# Patient Record
Sex: FEMALE | Race: WHITE | NOT HISPANIC OR LATINO | Employment: UNEMPLOYED | URBAN - NONMETROPOLITAN AREA
[De-identification: names, ages, dates, MRNs, and addresses within clinical notes are randomized per-mention and may not be internally consistent; named-entity substitution may affect disease eponyms.]

---

## 2017-10-27 ENCOUNTER — APPOINTMENT (OUTPATIENT)
Dept: GENERAL RADIOLOGY | Facility: HOSPITAL | Age: 72
End: 2017-10-27

## 2017-10-27 ENCOUNTER — HOSPITAL ENCOUNTER (OUTPATIENT)
Facility: HOSPITAL | Age: 72
Setting detail: OBSERVATION
Discharge: HOME | End: 2017-11-01
Attending: FAMILY MEDICINE | Admitting: INTERNAL MEDICINE

## 2017-10-27 DIAGNOSIS — R53.1 WEAKNESS: ICD-10-CM

## 2017-10-27 DIAGNOSIS — R06.09 DYSPNEA ON EXERTION: ICD-10-CM

## 2017-10-27 DIAGNOSIS — F03.91 DEMENTIA WITH BEHAVIORAL DISTURBANCE, UNSPECIFIED DEMENTIA TYPE: Primary | ICD-10-CM

## 2017-10-27 DIAGNOSIS — R06.02 SHORTNESS OF BREATH: ICD-10-CM

## 2017-10-27 LAB
ALBUMIN SERPL-MCNC: 3.7 G/DL (ref 3.4–4.8)
ALBUMIN/GLOB SERPL: 1.3 G/DL (ref 1.5–2.5)
ALP SERPL-CCNC: 109 U/L (ref 35–104)
ALT SERPL W P-5'-P-CCNC: 18 U/L (ref 10–36)
ANION GAP SERPL CALCULATED.3IONS-SCNC: 5.2 MMOL/L (ref 3.6–11.2)
AST SERPL-CCNC: 21 U/L (ref 10–30)
BASOPHILS # BLD AUTO: 0.05 10*3/MM3 (ref 0–0.3)
BASOPHILS NFR BLD AUTO: 0.5 % (ref 0–2)
BILIRUB SERPL-MCNC: 0.4 MG/DL (ref 0.2–1.8)
BNP SERPL-MCNC: 169 PG/ML (ref 0–100)
BUN BLD-MCNC: 11 MG/DL (ref 7–21)
BUN/CREAT SERPL: 9.6 (ref 7–25)
CALCIUM SPEC-SCNC: 9.1 MG/DL (ref 7.7–10)
CHLORIDE SERPL-SCNC: 105 MMOL/L (ref 99–112)
CO2 SERPL-SCNC: 32.8 MMOL/L (ref 24.3–31.9)
CREAT BLD-MCNC: 1.14 MG/DL (ref 0.43–1.29)
DEPRECATED RDW RBC AUTO: 42.9 FL (ref 37–54)
EOSINOPHIL # BLD AUTO: 0.3 10*3/MM3 (ref 0–0.7)
EOSINOPHIL NFR BLD AUTO: 3.1 % (ref 0–7)
ERYTHROCYTE [DISTWIDTH] IN BLOOD BY AUTOMATED COUNT: 13.9 % (ref 11.5–14.5)
GFR SERPL CREATININE-BSD FRML MDRD: 47 ML/MIN/1.73
GLOBULIN UR ELPH-MCNC: 2.9 GM/DL
GLUCOSE BLD-MCNC: 144 MG/DL (ref 70–110)
HCT VFR BLD AUTO: 34.4 % (ref 37–47)
HGB BLD-MCNC: 10.4 G/DL (ref 12–16)
IMM GRANULOCYTES # BLD: 0.03 10*3/MM3 (ref 0–0.03)
IMM GRANULOCYTES NFR BLD: 0.3 % (ref 0–0.5)
LIPASE SERPL-CCNC: 47 U/L (ref 13–60)
LYMPHOCYTES # BLD AUTO: 1.4 10*3/MM3 (ref 1–3)
LYMPHOCYTES NFR BLD AUTO: 14.6 % (ref 16–46)
MCH RBC QN AUTO: 26.2 PG (ref 27–33)
MCHC RBC AUTO-ENTMCNC: 30.2 G/DL (ref 33–37)
MCV RBC AUTO: 86.6 FL (ref 80–94)
MONOCYTES # BLD AUTO: 0.83 10*3/MM3 (ref 0.1–0.9)
MONOCYTES NFR BLD AUTO: 8.6 % (ref 0–12)
NEUTROPHILS # BLD AUTO: 7 10*3/MM3 (ref 1.4–6.5)
NEUTROPHILS NFR BLD AUTO: 72.9 % (ref 40–75)
OSMOLALITY SERPL CALC.SUM OF ELEC: 286.9 MOSM/KG (ref 273–305)
PLATELET # BLD AUTO: 375 10*3/MM3 (ref 130–400)
PMV BLD AUTO: 9.3 FL (ref 6–10)
POTASSIUM BLD-SCNC: 4 MMOL/L (ref 3.5–5.3)
PROT SERPL-MCNC: 6.6 G/DL (ref 6–8)
RBC # BLD AUTO: 3.97 10*6/MM3 (ref 4.2–5.4)
SODIUM BLD-SCNC: 143 MMOL/L (ref 135–153)
TROPONIN I SERPL-MCNC: <0.006 NG/ML
WBC NRBC COR # BLD: 9.61 10*3/MM3 (ref 4.5–12.5)

## 2017-10-27 PROCEDURE — 80053 COMPREHEN METABOLIC PANEL: CPT | Performed by: FAMILY MEDICINE

## 2017-10-27 PROCEDURE — 83880 ASSAY OF NATRIURETIC PEPTIDE: CPT | Performed by: FAMILY MEDICINE

## 2017-10-27 PROCEDURE — 84484 ASSAY OF TROPONIN QUANT: CPT | Performed by: FAMILY MEDICINE

## 2017-10-27 PROCEDURE — 93010 ELECTROCARDIOGRAM REPORT: CPT | Performed by: INTERNAL MEDICINE

## 2017-10-27 PROCEDURE — 71010 HC CHEST PA OR AP: CPT

## 2017-10-27 PROCEDURE — 85025 COMPLETE CBC W/AUTO DIFF WBC: CPT | Performed by: FAMILY MEDICINE

## 2017-10-27 PROCEDURE — 71010 XR CHEST 1 VW: CPT | Performed by: RADIOLOGY

## 2017-10-27 PROCEDURE — 83690 ASSAY OF LIPASE: CPT | Performed by: FAMILY MEDICINE

## 2017-10-27 PROCEDURE — 99285 EMERGENCY DEPT VISIT HI MDM: CPT

## 2017-10-27 PROCEDURE — 93005 ELECTROCARDIOGRAM TRACING: CPT | Performed by: FAMILY MEDICINE

## 2017-10-27 RX ORDER — BUMETANIDE 0.5 MG/1
2 TABLET ORAL ONCE
Status: COMPLETED | OUTPATIENT
Start: 2017-10-27 | End: 2017-10-27

## 2017-10-27 RX ADMIN — BUMETANIDE 2 MG: 0.5 TABLET ORAL at 21:21

## 2017-10-28 ENCOUNTER — APPOINTMENT (OUTPATIENT)
Dept: CT IMAGING | Facility: HOSPITAL | Age: 72
End: 2017-10-28

## 2017-10-28 ENCOUNTER — APPOINTMENT (OUTPATIENT)
Dept: CARDIOLOGY | Facility: HOSPITAL | Age: 72
End: 2017-10-28
Attending: INTERNAL MEDICINE

## 2017-10-28 PROBLEM — R06.00 DYSPNEA: Status: ACTIVE | Noted: 2017-10-28

## 2017-10-28 LAB
ALBUMIN SERPL-MCNC: 3.5 G/DL (ref 3.4–4.8)
ALBUMIN/GLOB SERPL: 1 G/DL (ref 1.5–2.5)
ALP SERPL-CCNC: 107 U/L (ref 35–104)
ALT SERPL W P-5'-P-CCNC: 13 U/L (ref 10–36)
ANION GAP SERPL CALCULATED.3IONS-SCNC: 6.2 MMOL/L (ref 3.6–11.2)
AST SERPL-CCNC: 17 U/L (ref 10–30)
BASOPHILS # BLD AUTO: 0.05 10*3/MM3 (ref 0–0.3)
BASOPHILS NFR BLD AUTO: 0.6 % (ref 0–2)
BILIRUB SERPL-MCNC: 0.5 MG/DL (ref 0.2–1.8)
BILIRUB UR QL STRIP: NEGATIVE
BUN BLD-MCNC: 10 MG/DL (ref 7–21)
BUN/CREAT SERPL: 7.8 (ref 7–25)
CALCIUM SPEC-SCNC: 9 MG/DL (ref 7.7–10)
CHLORIDE SERPL-SCNC: 104 MMOL/L (ref 99–112)
CLARITY UR: ABNORMAL
CO2 SERPL-SCNC: 34.8 MMOL/L (ref 24.3–31.9)
COLOR UR: YELLOW
CREAT BLD-MCNC: 1.28 MG/DL (ref 0.43–1.29)
DEPRECATED RDW RBC AUTO: 43 FL (ref 37–54)
EOSINOPHIL # BLD AUTO: 0.29 10*3/MM3 (ref 0–0.7)
EOSINOPHIL NFR BLD AUTO: 3.3 % (ref 0–7)
ERYTHROCYTE [DISTWIDTH] IN BLOOD BY AUTOMATED COUNT: 13.9 % (ref 11.5–14.5)
FOLATE SERPL-MCNC: 10.94 NG/ML (ref 5.4–20)
GFR SERPL CREATININE-BSD FRML MDRD: 41 ML/MIN/1.73
GLOBULIN UR ELPH-MCNC: 3.4 GM/DL
GLUCOSE BLD-MCNC: 185 MG/DL (ref 70–110)
GLUCOSE BLDC GLUCOMTR-MCNC: 154 MG/DL (ref 70–130)
GLUCOSE BLDC GLUCOMTR-MCNC: 182 MG/DL (ref 70–130)
GLUCOSE BLDC GLUCOMTR-MCNC: 212 MG/DL (ref 70–130)
GLUCOSE BLDC GLUCOMTR-MCNC: 235 MG/DL (ref 70–130)
GLUCOSE UR STRIP-MCNC: NEGATIVE MG/DL
HBA1C MFR BLD: 8.4 % (ref 4.5–5.7)
HCT VFR BLD AUTO: 34.5 % (ref 37–47)
HGB BLD-MCNC: 10.5 G/DL (ref 12–16)
HGB UR QL STRIP.AUTO: NEGATIVE
IMM GRANULOCYTES # BLD: 0.02 10*3/MM3 (ref 0–0.03)
IMM GRANULOCYTES NFR BLD: 0.2 % (ref 0–0.5)
KETONES UR QL STRIP: NEGATIVE
LEUKOCYTE ESTERASE UR QL STRIP.AUTO: NEGATIVE
LYMPHOCYTES # BLD AUTO: 1.23 10*3/MM3 (ref 1–3)
LYMPHOCYTES NFR BLD AUTO: 14 % (ref 16–46)
MAGNESIUM SERPL-MCNC: 1.7 MG/DL (ref 1.7–2.6)
MCH RBC QN AUTO: 26.4 PG (ref 27–33)
MCHC RBC AUTO-ENTMCNC: 30.4 G/DL (ref 33–37)
MCV RBC AUTO: 86.9 FL (ref 80–94)
MONOCYTES # BLD AUTO: 0.8 10*3/MM3 (ref 0.1–0.9)
MONOCYTES NFR BLD AUTO: 9.1 % (ref 0–12)
NEUTROPHILS # BLD AUTO: 6.39 10*3/MM3 (ref 1.4–6.5)
NEUTROPHILS NFR BLD AUTO: 72.8 % (ref 40–75)
NITRITE UR QL STRIP: NEGATIVE
OSMOLALITY SERPL CALC.SUM OF ELEC: 292.5 MOSM/KG (ref 273–305)
PH UR STRIP.AUTO: 8.5 [PH] (ref 5–8)
PLATELET # BLD AUTO: 345 10*3/MM3 (ref 130–400)
PMV BLD AUTO: 9.5 FL (ref 6–10)
POTASSIUM BLD-SCNC: 3.4 MMOL/L (ref 3.5–5.3)
PROT SERPL-MCNC: 6.9 G/DL (ref 6–8)
PROT UR QL STRIP: NEGATIVE
RBC # BLD AUTO: 3.97 10*6/MM3 (ref 4.2–5.4)
SODIUM BLD-SCNC: 145 MMOL/L (ref 135–153)
SP GR UR STRIP: 1.01 (ref 1–1.03)
TSH SERPL DL<=0.05 MIU/L-ACNC: 2.55 MIU/ML (ref 0.55–4.78)
UROBILINOGEN UR QL STRIP: ABNORMAL
VIT B12 BLD-MCNC: 219 PG/ML (ref 211–911)
WBC NRBC COR # BLD: 8.78 10*3/MM3 (ref 4.5–12.5)

## 2017-10-28 PROCEDURE — 81003 URINALYSIS AUTO W/O SCOPE: CPT | Performed by: FAMILY MEDICINE

## 2017-10-28 PROCEDURE — 25010000002 INFLUENZA VAC SPLIT QUAD 0.5 ML SUSPENSION PREFILLED SYRINGE: Performed by: INTERNAL MEDICINE

## 2017-10-28 PROCEDURE — 25010000002 HYDRALAZINE PER 20 MG: Performed by: INTERNAL MEDICINE

## 2017-10-28 PROCEDURE — 25010000002 FUROSEMIDE PER 20 MG: Performed by: INTERNAL MEDICINE

## 2017-10-28 PROCEDURE — G0008 ADMIN INFLUENZA VIRUS VAC: HCPCS | Performed by: INTERNAL MEDICINE

## 2017-10-28 PROCEDURE — 82962 GLUCOSE BLOOD TEST: CPT

## 2017-10-28 PROCEDURE — 82746 ASSAY OF FOLIC ACID SERUM: CPT | Performed by: INTERNAL MEDICINE

## 2017-10-28 PROCEDURE — 83735 ASSAY OF MAGNESIUM: CPT | Performed by: INTERNAL MEDICINE

## 2017-10-28 PROCEDURE — 83036 HEMOGLOBIN GLYCOSYLATED A1C: CPT | Performed by: INTERNAL MEDICINE

## 2017-10-28 PROCEDURE — 82607 VITAMIN B-12: CPT | Performed by: INTERNAL MEDICINE

## 2017-10-28 PROCEDURE — 84443 ASSAY THYROID STIM HORMONE: CPT | Performed by: INTERNAL MEDICINE

## 2017-10-28 PROCEDURE — 96372 THER/PROPH/DIAG INJ SC/IM: CPT

## 2017-10-28 PROCEDURE — 94799 UNLISTED PULMONARY SVC/PX: CPT

## 2017-10-28 PROCEDURE — 85025 COMPLETE CBC W/AUTO DIFF WBC: CPT | Performed by: INTERNAL MEDICINE

## 2017-10-28 PROCEDURE — 99220 PR INITIAL OBSERVATION CARE/DAY 70 MINUTES: CPT | Performed by: INTERNAL MEDICINE

## 2017-10-28 PROCEDURE — G0378 HOSPITAL OBSERVATION PER HR: HCPCS

## 2017-10-28 PROCEDURE — 90686 IIV4 VACC NO PRSV 0.5 ML IM: CPT | Performed by: INTERNAL MEDICINE

## 2017-10-28 PROCEDURE — 96374 THER/PROPH/DIAG INJ IV PUSH: CPT

## 2017-10-28 PROCEDURE — 63710000001 INSULIN ASPART PER 5 UNITS: Performed by: INTERNAL MEDICINE

## 2017-10-28 PROCEDURE — 96375 TX/PRO/DX INJ NEW DRUG ADDON: CPT

## 2017-10-28 PROCEDURE — 80053 COMPREHEN METABOLIC PANEL: CPT | Performed by: INTERNAL MEDICINE

## 2017-10-28 PROCEDURE — 25010000002 HEPARIN (PORCINE) PER 1000 UNITS: Performed by: INTERNAL MEDICINE

## 2017-10-28 RX ORDER — CARVEDILOL 6.25 MG/1
6.25 TABLET ORAL EVERY 12 HOURS SCHEDULED
Status: DISCONTINUED | OUTPATIENT
Start: 2017-10-28 | End: 2017-10-28

## 2017-10-28 RX ORDER — LEVOTHYROXINE SODIUM 0.03 MG/1
25 TABLET ORAL DAILY
Status: DISCONTINUED | OUTPATIENT
Start: 2017-10-28 | End: 2017-11-01 | Stop reason: HOSPADM

## 2017-10-28 RX ORDER — FUROSEMIDE 10 MG/ML
20 INJECTION INTRAMUSCULAR; INTRAVENOUS DAILY
Status: DISCONTINUED | OUTPATIENT
Start: 2017-10-28 | End: 2017-10-29

## 2017-10-28 RX ORDER — ATORVASTATIN CALCIUM 20 MG/1
20 TABLET, FILM COATED ORAL DAILY
COMMUNITY

## 2017-10-28 RX ORDER — CITALOPRAM 10 MG/1
10 TABLET ORAL DAILY
COMMUNITY

## 2017-10-28 RX ORDER — DONEPEZIL HYDROCHLORIDE 5 MG/1
10 TABLET, FILM COATED ORAL DAILY
Status: DISCONTINUED | OUTPATIENT
Start: 2017-10-28 | End: 2017-11-01 | Stop reason: HOSPADM

## 2017-10-28 RX ORDER — ATORVASTATIN CALCIUM 20 MG/1
20 TABLET, FILM COATED ORAL DAILY
Status: DISCONTINUED | OUTPATIENT
Start: 2017-10-28 | End: 2017-11-01 | Stop reason: HOSPADM

## 2017-10-28 RX ORDER — SODIUM CHLORIDE 0.9 % (FLUSH) 0.9 %
1-10 SYRINGE (ML) INJECTION AS NEEDED
Status: DISCONTINUED | OUTPATIENT
Start: 2017-10-28 | End: 2017-11-01 | Stop reason: HOSPADM

## 2017-10-28 RX ORDER — POTASSIUM CHLORIDE 20 MEQ/1
20 TABLET, EXTENDED RELEASE ORAL DAILY
Status: DISCONTINUED | OUTPATIENT
Start: 2017-10-28 | End: 2017-10-31

## 2017-10-28 RX ORDER — FUROSEMIDE 20 MG/1
20 TABLET ORAL DAILY
COMMUNITY
End: 2017-11-01 | Stop reason: HOSPADM

## 2017-10-28 RX ORDER — NICOTINE POLACRILEX 4 MG
15 LOZENGE BUCCAL
Status: DISCONTINUED | OUTPATIENT
Start: 2017-10-28 | End: 2017-11-01 | Stop reason: HOSPADM

## 2017-10-28 RX ORDER — IPRATROPIUM BROMIDE AND ALBUTEROL SULFATE 2.5; .5 MG/3ML; MG/3ML
3 SOLUTION RESPIRATORY (INHALATION) 3 TIMES DAILY PRN
COMMUNITY

## 2017-10-28 RX ORDER — HEPARIN SODIUM 5000 [USP'U]/ML
5000 INJECTION, SOLUTION INTRAVENOUS; SUBCUTANEOUS EVERY 12 HOURS SCHEDULED
Status: DISCONTINUED | OUTPATIENT
Start: 2017-10-28 | End: 2017-11-01 | Stop reason: HOSPADM

## 2017-10-28 RX ORDER — CARVEDILOL 6.25 MG/1
12.5 TABLET ORAL EVERY 12 HOURS SCHEDULED
Status: DISCONTINUED | OUTPATIENT
Start: 2017-10-29 | End: 2017-11-01 | Stop reason: HOSPADM

## 2017-10-28 RX ORDER — POTASSIUM CHLORIDE 750 MG/1
10 TABLET, FILM COATED, EXTENDED RELEASE ORAL DAILY
COMMUNITY
End: 2017-11-01 | Stop reason: HOSPADM

## 2017-10-28 RX ORDER — LOSARTAN POTASSIUM AND HYDROCHLOROTHIAZIDE 25; 100 MG/1; MG/1
1 TABLET ORAL DAILY
Status: ON HOLD | COMMUNITY
End: 2017-10-28

## 2017-10-28 RX ORDER — LEVOTHYROXINE SODIUM 0.03 MG/1
25 TABLET ORAL DAILY
COMMUNITY

## 2017-10-28 RX ORDER — METRONIDAZOLE 250 MG/1
250 TABLET ORAL 3 TIMES DAILY
Status: ON HOLD | COMMUNITY
End: 2017-10-28

## 2017-10-28 RX ORDER — POTASSIUM CHLORIDE 750 MG/1
10 TABLET, FILM COATED, EXTENDED RELEASE ORAL DAILY
Status: CANCELLED | OUTPATIENT
Start: 2017-10-28

## 2017-10-28 RX ORDER — LANOLIN ALCOHOL/MO/W.PET/CERES
1000 CREAM (GRAM) TOPICAL DAILY
Status: DISCONTINUED | OUTPATIENT
Start: 2017-10-29 | End: 2017-11-01 | Stop reason: HOSPADM

## 2017-10-28 RX ORDER — DONEPEZIL HYDROCHLORIDE 10 MG/1
10 TABLET, FILM COATED ORAL DAILY
COMMUNITY
End: 2017-11-01 | Stop reason: HOSPADM

## 2017-10-28 RX ORDER — FUROSEMIDE 20 MG/1
20 TABLET ORAL DAILY
Status: CANCELLED | OUTPATIENT
Start: 2017-10-28

## 2017-10-28 RX ORDER — LEVOFLOXACIN 250 MG/1
250 TABLET ORAL DAILY
Status: ON HOLD | COMMUNITY
End: 2017-10-28

## 2017-10-28 RX ORDER — METFORMIN HYDROCHLORIDE 500 MG/1
500 TABLET, EXTENDED RELEASE ORAL 2 TIMES DAILY
COMMUNITY
End: 2017-11-01 | Stop reason: HOSPADM

## 2017-10-28 RX ORDER — HYDRALAZINE HYDROCHLORIDE 20 MG/ML
20 INJECTION INTRAMUSCULAR; INTRAVENOUS EVERY 6 HOURS PRN
Status: DISCONTINUED | OUTPATIENT
Start: 2017-10-28 | End: 2017-11-01 | Stop reason: HOSPADM

## 2017-10-28 RX ORDER — DEXTROSE MONOHYDRATE 25 G/50ML
25 INJECTION, SOLUTION INTRAVENOUS
Status: DISCONTINUED | OUTPATIENT
Start: 2017-10-28 | End: 2017-11-01 | Stop reason: HOSPADM

## 2017-10-28 RX ORDER — CITALOPRAM 20 MG/1
10 TABLET ORAL DAILY
Status: DISCONTINUED | OUTPATIENT
Start: 2017-10-28 | End: 2017-11-01 | Stop reason: HOSPADM

## 2017-10-28 RX ORDER — IPRATROPIUM BROMIDE AND ALBUTEROL SULFATE 2.5; .5 MG/3ML; MG/3ML
3 SOLUTION RESPIRATORY (INHALATION) 3 TIMES DAILY PRN
Status: CANCELLED | OUTPATIENT
Start: 2017-10-28

## 2017-10-28 RX ORDER — IPRATROPIUM BROMIDE AND ALBUTEROL SULFATE 2.5; .5 MG/3ML; MG/3ML
3 SOLUTION RESPIRATORY (INHALATION) EVERY 6 HOURS PRN
Status: DISCONTINUED | OUTPATIENT
Start: 2017-10-28 | End: 2017-11-01 | Stop reason: HOSPADM

## 2017-10-28 RX ADMIN — INSULIN ASPART 2 UNITS: 100 INJECTION, SOLUTION INTRAVENOUS; SUBCUTANEOUS at 07:43

## 2017-10-28 RX ADMIN — INFLUENZA VIRUS VACCINE 0.5 ML: 15; 15; 15; 15 SUSPENSION INTRAMUSCULAR at 12:16

## 2017-10-28 RX ADMIN — CITALOPRAM HYDROBROMIDE 10 MG: 20 TABLET ORAL at 20:38

## 2017-10-28 RX ADMIN — HYDRALAZINE HYDROCHLORIDE 20 MG: 20 INJECTION INTRAMUSCULAR; INTRAVENOUS at 06:52

## 2017-10-28 RX ADMIN — HEPARIN SODIUM 5000 UNITS: 5000 INJECTION, SOLUTION INTRAVENOUS; SUBCUTANEOUS at 09:21

## 2017-10-28 RX ADMIN — INSULIN ASPART 2 UNITS: 100 INJECTION, SOLUTION INTRAVENOUS; SUBCUTANEOUS at 20:32

## 2017-10-28 RX ADMIN — CARVEDILOL 6.25 MG: 6.25 TABLET, FILM COATED ORAL at 06:51

## 2017-10-28 RX ADMIN — DONEPEZIL HYDROCHLORIDE 10 MG: 5 TABLET, FILM COATED ORAL at 20:37

## 2017-10-28 RX ADMIN — FUROSEMIDE 20 MG: 10 INJECTION, SOLUTION INTRAMUSCULAR; INTRAVENOUS at 09:21

## 2017-10-28 RX ADMIN — ATORVASTATIN CALCIUM 20 MG: 20 TABLET, FILM COATED ORAL at 20:37

## 2017-10-28 RX ADMIN — HEPARIN SODIUM 5000 UNITS: 5000 INJECTION, SOLUTION INTRAVENOUS; SUBCUTANEOUS at 20:32

## 2017-10-28 RX ADMIN — INSULIN ASPART 3 UNITS: 100 INJECTION, SOLUTION INTRAVENOUS; SUBCUTANEOUS at 12:03

## 2017-10-28 RX ADMIN — INSULIN ASPART 3 UNITS: 100 INJECTION, SOLUTION INTRAVENOUS; SUBCUTANEOUS at 17:39

## 2017-10-28 RX ADMIN — LEVOTHYROXINE SODIUM 25 MCG: 25 TABLET ORAL at 20:37

## 2017-10-28 RX ADMIN — POTASSIUM CHLORIDE 20 MEQ: 1500 TABLET, EXTENDED RELEASE ORAL at 09:21

## 2017-10-28 RX ADMIN — CARVEDILOL 6.25 MG: 6.25 TABLET, FILM COATED ORAL at 20:32

## 2017-10-29 ENCOUNTER — APPOINTMENT (OUTPATIENT)
Dept: CARDIOLOGY | Facility: HOSPITAL | Age: 72
End: 2017-10-29
Attending: INTERNAL MEDICINE

## 2017-10-29 LAB
ANION GAP SERPL CALCULATED.3IONS-SCNC: 9.4 MMOL/L (ref 3.6–11.2)
BASOPHILS # BLD AUTO: 0.05 10*3/MM3 (ref 0–0.3)
BASOPHILS NFR BLD AUTO: 0.6 % (ref 0–2)
BH CV ECHO MEAS - % IVS THICK: -4.3 %
BH CV ECHO MEAS - % LVPW THICK: 7.7 %
BH CV ECHO MEAS - ACS: 1.6 CM
BH CV ECHO MEAS - AO ROOT AREA (BSA CORRECTED): 1.5
BH CV ECHO MEAS - AO ROOT AREA: 6.9 CM^2
BH CV ECHO MEAS - AO ROOT DIAM: 3 CM
BH CV ECHO MEAS - BSA(HAYCOCK): 2.1 M^2
BH CV ECHO MEAS - BSA: 2 M^2
BH CV ECHO MEAS - BZI_BMI: 37.7 KILOGRAMS/M^2
BH CV ECHO MEAS - BZI_METRIC_HEIGHT: 160 CM
BH CV ECHO MEAS - BZI_METRIC_WEIGHT: 96.6 KG
BH CV ECHO MEAS - CONTRAST EF 4CH: 78.2 ML/M^2
BH CV ECHO MEAS - EDV(CUBED): 120.6 ML
BH CV ECHO MEAS - EDV(MOD-SP4): 55 ML
BH CV ECHO MEAS - EDV(TEICH): 115 ML
BH CV ECHO MEAS - EF(CUBED): 77.1 %
BH CV ECHO MEAS - EF(MOD-SP4): 78.2 %
BH CV ECHO MEAS - EF(TEICH): 68.9 %
BH CV ECHO MEAS - ESV(CUBED): 27.7 ML
BH CV ECHO MEAS - ESV(MOD-SP4): 12 ML
BH CV ECHO MEAS - ESV(TEICH): 35.7 ML
BH CV ECHO MEAS - FS: 38.8 %
BH CV ECHO MEAS - IVS/LVPW: 0.88
BH CV ECHO MEAS - IVSD: 1.2 CM
BH CV ECHO MEAS - IVSS: 1.1 CM
BH CV ECHO MEAS - LV DIASTOLIC VOL/BSA (35-75): 27.7 ML/M^2
BH CV ECHO MEAS - LV MASS(C)D: 238.9 GRAMS
BH CV ECHO MEAS - LV MASS(C)DI: 120.3 GRAMS/M^2
BH CV ECHO MEAS - LV MASS(C)S: 119.5 GRAMS
BH CV ECHO MEAS - LV MASS(C)SI: 60.1 GRAMS/M^2
BH CV ECHO MEAS - LV SYSTOLIC VOL/BSA (12-30): 6 ML/M^2
BH CV ECHO MEAS - LVIDD: 4.9 CM
BH CV ECHO MEAS - LVIDS: 3 CM
BH CV ECHO MEAS - LVLD AP4: 6.6 CM
BH CV ECHO MEAS - LVLS AP4: 6.1 CM
BH CV ECHO MEAS - LVOT AREA (M): 2 CM^2
BH CV ECHO MEAS - LVOT AREA: 2 CM^2
BH CV ECHO MEAS - LVOT DIAM: 1.6 CM
BH CV ECHO MEAS - LVPWD: 1.3 CM
BH CV ECHO MEAS - LVPWS: 1.4 CM
BH CV ECHO MEAS - MV A MAX VEL: 136.6 CM/SEC
BH CV ECHO MEAS - MV DEC SLOPE: 481.1 CM/SEC^2
BH CV ECHO MEAS - MV E MAX VEL: 115.7 CM/SEC
BH CV ECHO MEAS - MV E/A: 0.85
BH CV ECHO MEAS - MV P1/2T MAX VEL: 116.8 CM/SEC
BH CV ECHO MEAS - MV P1/2T: 71.1 MSEC
BH CV ECHO MEAS - MVA P1/2T LCG: 1.9 CM^2
BH CV ECHO MEAS - MVA(P1/2T): 3.1 CM^2
BH CV ECHO MEAS - RAP SYSTOLE: 10 MMHG
BH CV ECHO MEAS - RVDD: 2 CM
BH CV ECHO MEAS - RVSP: 38.3 MMHG
BH CV ECHO MEAS - SI(CUBED): 46.8 ML/M^2
BH CV ECHO MEAS - SI(MOD-SP4): 21.6 ML/M^2
BH CV ECHO MEAS - SI(TEICH): 39.9 ML/M^2
BH CV ECHO MEAS - SV(CUBED): 92.9 ML
BH CV ECHO MEAS - SV(MOD-SP4): 43 ML
BH CV ECHO MEAS - SV(TEICH): 79.3 ML
BH CV ECHO MEAS - TR MAX VEL: 266 CM/SEC
BUN BLD-MCNC: 11 MG/DL (ref 7–21)
BUN/CREAT SERPL: 9.2 (ref 7–25)
CALCIUM SPEC-SCNC: 8.9 MG/DL (ref 7.7–10)
CHLORIDE SERPL-SCNC: 102 MMOL/L (ref 99–112)
CO2 SERPL-SCNC: 28.6 MMOL/L (ref 24.3–31.9)
CREAT BLD-MCNC: 1.2 MG/DL (ref 0.43–1.29)
DEPRECATED RDW RBC AUTO: 43.6 FL (ref 37–54)
EOSINOPHIL # BLD AUTO: 0.28 10*3/MM3 (ref 0–0.7)
EOSINOPHIL NFR BLD AUTO: 3.3 % (ref 0–7)
ERYTHROCYTE [DISTWIDTH] IN BLOOD BY AUTOMATED COUNT: 13.9 % (ref 11.5–14.5)
GFR SERPL CREATININE-BSD FRML MDRD: 44 ML/MIN/1.73
GLUCOSE BLD-MCNC: 290 MG/DL (ref 70–110)
GLUCOSE BLDC GLUCOMTR-MCNC: 157 MG/DL (ref 70–130)
GLUCOSE BLDC GLUCOMTR-MCNC: 243 MG/DL (ref 70–130)
GLUCOSE BLDC GLUCOMTR-MCNC: 248 MG/DL (ref 70–130)
GLUCOSE BLDC GLUCOMTR-MCNC: 254 MG/DL (ref 70–130)
HCT VFR BLD AUTO: 35.3 % (ref 37–47)
HGB BLD-MCNC: 10.7 G/DL (ref 12–16)
IMM GRANULOCYTES # BLD: 0.03 10*3/MM3 (ref 0–0.03)
IMM GRANULOCYTES NFR BLD: 0.4 % (ref 0–0.5)
LYMPHOCYTES # BLD AUTO: 1.03 10*3/MM3 (ref 1–3)
LYMPHOCYTES NFR BLD AUTO: 12.3 % (ref 16–46)
MCH RBC QN AUTO: 26.4 PG (ref 27–33)
MCHC RBC AUTO-ENTMCNC: 30.3 G/DL (ref 33–37)
MCV RBC AUTO: 86.9 FL (ref 80–94)
MONOCYTES # BLD AUTO: 0.61 10*3/MM3 (ref 0.1–0.9)
MONOCYTES NFR BLD AUTO: 7.3 % (ref 0–12)
NEUTROPHILS # BLD AUTO: 6.38 10*3/MM3 (ref 1.4–6.5)
NEUTROPHILS NFR BLD AUTO: 76.1 % (ref 40–75)
OSMOLALITY SERPL CALC.SUM OF ELEC: 289.4 MOSM/KG (ref 273–305)
PLATELET # BLD AUTO: 353 10*3/MM3 (ref 130–400)
PMV BLD AUTO: 9.7 FL (ref 6–10)
POTASSIUM BLD-SCNC: 3.7 MMOL/L (ref 3.5–5.3)
RBC # BLD AUTO: 4.06 10*6/MM3 (ref 4.2–5.4)
SODIUM BLD-SCNC: 140 MMOL/L (ref 135–153)
WBC NRBC COR # BLD: 8.38 10*3/MM3 (ref 4.5–12.5)

## 2017-10-29 PROCEDURE — 80048 BASIC METABOLIC PNL TOTAL CA: CPT | Performed by: INTERNAL MEDICINE

## 2017-10-29 PROCEDURE — 94799 UNLISTED PULMONARY SVC/PX: CPT

## 2017-10-29 PROCEDURE — G0378 HOSPITAL OBSERVATION PER HR: HCPCS

## 2017-10-29 PROCEDURE — 82962 GLUCOSE BLOOD TEST: CPT

## 2017-10-29 PROCEDURE — 25010000002 FUROSEMIDE PER 20 MG: Performed by: INTERNAL MEDICINE

## 2017-10-29 PROCEDURE — 63710000001 INSULIN ASPART PER 5 UNITS: Performed by: INTERNAL MEDICINE

## 2017-10-29 PROCEDURE — 85025 COMPLETE CBC W/AUTO DIFF WBC: CPT | Performed by: INTERNAL MEDICINE

## 2017-10-29 PROCEDURE — 99225 PR SBSQ OBSERVATION CARE/DAY 25 MINUTES: CPT | Performed by: INTERNAL MEDICINE

## 2017-10-29 PROCEDURE — 25010000002 HEPARIN (PORCINE) PER 1000 UNITS: Performed by: INTERNAL MEDICINE

## 2017-10-29 PROCEDURE — 96372 THER/PROPH/DIAG INJ SC/IM: CPT

## 2017-10-29 PROCEDURE — 93306 TTE W/DOPPLER COMPLETE: CPT

## 2017-10-29 PROCEDURE — 93306 TTE W/DOPPLER COMPLETE: CPT | Performed by: INTERNAL MEDICINE

## 2017-10-29 PROCEDURE — 96376 TX/PRO/DX INJ SAME DRUG ADON: CPT

## 2017-10-29 RX ORDER — FUROSEMIDE 10 MG/ML
40 INJECTION INTRAMUSCULAR; INTRAVENOUS DAILY
Status: DISCONTINUED | OUTPATIENT
Start: 2017-10-29 | End: 2017-10-30

## 2017-10-29 RX ADMIN — INSULIN ASPART 3 UNITS: 100 INJECTION, SOLUTION INTRAVENOUS; SUBCUTANEOUS at 21:20

## 2017-10-29 RX ADMIN — CARVEDILOL 12.5 MG: 6.25 TABLET, FILM COATED ORAL at 09:33

## 2017-10-29 RX ADMIN — CYANOCOBALAMIN TAB 1000 MCG 1000 MCG: 1000 TAB at 09:33

## 2017-10-29 RX ADMIN — HEPARIN SODIUM 5000 UNITS: 5000 INJECTION, SOLUTION INTRAVENOUS; SUBCUTANEOUS at 21:20

## 2017-10-29 RX ADMIN — INSULIN ASPART 2 UNITS: 100 INJECTION, SOLUTION INTRAVENOUS; SUBCUTANEOUS at 07:40

## 2017-10-29 RX ADMIN — POTASSIUM CHLORIDE 20 MEQ: 1500 TABLET, EXTENDED RELEASE ORAL at 09:33

## 2017-10-29 RX ADMIN — FUROSEMIDE 40 MG: 10 INJECTION, SOLUTION INTRAMUSCULAR; INTRAVENOUS at 09:33

## 2017-10-29 RX ADMIN — INSULIN ASPART 4 UNITS: 100 INJECTION, SOLUTION INTRAVENOUS; SUBCUTANEOUS at 17:30

## 2017-10-29 RX ADMIN — INSULIN ASPART 3 UNITS: 100 INJECTION, SOLUTION INTRAVENOUS; SUBCUTANEOUS at 11:50

## 2017-10-29 RX ADMIN — HEPARIN SODIUM 5000 UNITS: 5000 INJECTION, SOLUTION INTRAVENOUS; SUBCUTANEOUS at 09:33

## 2017-10-29 RX ADMIN — CARVEDILOL 12.5 MG: 6.25 TABLET, FILM COATED ORAL at 21:19

## 2017-10-30 ENCOUNTER — APPOINTMENT (OUTPATIENT)
Dept: CT IMAGING | Facility: HOSPITAL | Age: 72
End: 2017-10-30

## 2017-10-30 LAB
ANION GAP SERPL CALCULATED.3IONS-SCNC: 7.2 MMOL/L (ref 3.6–11.2)
BASOPHILS # BLD AUTO: 0.04 10*3/MM3 (ref 0–0.3)
BASOPHILS NFR BLD AUTO: 0.4 % (ref 0–2)
BUN BLD-MCNC: 15 MG/DL (ref 7–21)
BUN/CREAT SERPL: 12.9 (ref 7–25)
CALCIUM SPEC-SCNC: 8.8 MG/DL (ref 7.7–10)
CHLORIDE SERPL-SCNC: 103 MMOL/L (ref 99–112)
CO2 SERPL-SCNC: 30.8 MMOL/L (ref 24.3–31.9)
CREAT BLD-MCNC: 1.16 MG/DL (ref 0.43–1.29)
DEPRECATED RDW RBC AUTO: 43.2 FL (ref 37–54)
EOSINOPHIL # BLD AUTO: 0.29 10*3/MM3 (ref 0–0.7)
EOSINOPHIL NFR BLD AUTO: 3.2 % (ref 0–7)
ERYTHROCYTE [DISTWIDTH] IN BLOOD BY AUTOMATED COUNT: 13.9 % (ref 11.5–14.5)
GFR SERPL CREATININE-BSD FRML MDRD: 46 ML/MIN/1.73
GLUCOSE BLD-MCNC: 144 MG/DL (ref 70–110)
GLUCOSE BLDC GLUCOMTR-MCNC: 146 MG/DL (ref 70–130)
GLUCOSE BLDC GLUCOMTR-MCNC: 199 MG/DL (ref 70–130)
GLUCOSE BLDC GLUCOMTR-MCNC: 210 MG/DL (ref 70–130)
GLUCOSE BLDC GLUCOMTR-MCNC: 234 MG/DL (ref 70–130)
HCT VFR BLD AUTO: 34.3 % (ref 37–47)
HGB BLD-MCNC: 10.5 G/DL (ref 12–16)
IMM GRANULOCYTES # BLD: 0.01 10*3/MM3 (ref 0–0.03)
IMM GRANULOCYTES NFR BLD: 0.1 % (ref 0–0.5)
LYMPHOCYTES # BLD AUTO: 1.61 10*3/MM3 (ref 1–3)
LYMPHOCYTES NFR BLD AUTO: 17.5 % (ref 16–46)
MCH RBC QN AUTO: 26.6 PG (ref 27–33)
MCHC RBC AUTO-ENTMCNC: 30.6 G/DL (ref 33–37)
MCV RBC AUTO: 87.1 FL (ref 80–94)
MONOCYTES # BLD AUTO: 0.8 10*3/MM3 (ref 0.1–0.9)
MONOCYTES NFR BLD AUTO: 8.7 % (ref 0–12)
NEUTROPHILS # BLD AUTO: 6.45 10*3/MM3 (ref 1.4–6.5)
NEUTROPHILS NFR BLD AUTO: 70.1 % (ref 40–75)
OSMOLALITY SERPL CALC.SUM OF ELEC: 284.6 MOSM/KG (ref 273–305)
PLATELET # BLD AUTO: 364 10*3/MM3 (ref 130–400)
PMV BLD AUTO: 9.5 FL (ref 6–10)
POTASSIUM BLD-SCNC: 3.8 MMOL/L (ref 3.5–5.3)
RBC # BLD AUTO: 3.94 10*6/MM3 (ref 4.2–5.4)
SODIUM BLD-SCNC: 141 MMOL/L (ref 135–153)
WBC NRBC COR # BLD: 9.2 10*3/MM3 (ref 4.5–12.5)

## 2017-10-30 PROCEDURE — G0378 HOSPITAL OBSERVATION PER HR: HCPCS

## 2017-10-30 PROCEDURE — 97116 GAIT TRAINING THERAPY: CPT

## 2017-10-30 PROCEDURE — 63710000001 INSULIN ASPART PER 5 UNITS: Performed by: INTERNAL MEDICINE

## 2017-10-30 PROCEDURE — 25010000002 HEPARIN (PORCINE) PER 1000 UNITS: Performed by: INTERNAL MEDICINE

## 2017-10-30 PROCEDURE — 85025 COMPLETE CBC W/AUTO DIFF WBC: CPT | Performed by: INTERNAL MEDICINE

## 2017-10-30 PROCEDURE — 63710000001 INSULIN DETEMIR PER 5 UNITS: Performed by: INTERNAL MEDICINE

## 2017-10-30 PROCEDURE — 71250 CT THORAX DX C-: CPT | Performed by: RADIOLOGY

## 2017-10-30 PROCEDURE — 99226 PR SBSQ OBSERVATION CARE/DAY 35 MINUTES: CPT | Performed by: INTERNAL MEDICINE

## 2017-10-30 PROCEDURE — G8979 MOBILITY GOAL STATUS: HCPCS

## 2017-10-30 PROCEDURE — 71250 CT THORAX DX C-: CPT

## 2017-10-30 PROCEDURE — 97163 PT EVAL HIGH COMPLEX 45 MIN: CPT

## 2017-10-30 PROCEDURE — G8987 SELF CARE CURRENT STATUS: HCPCS

## 2017-10-30 PROCEDURE — G8978 MOBILITY CURRENT STATUS: HCPCS

## 2017-10-30 PROCEDURE — 96376 TX/PRO/DX INJ SAME DRUG ADON: CPT

## 2017-10-30 PROCEDURE — 94799 UNLISTED PULMONARY SVC/PX: CPT

## 2017-10-30 PROCEDURE — 97110 THERAPEUTIC EXERCISES: CPT

## 2017-10-30 PROCEDURE — 80048 BASIC METABOLIC PNL TOTAL CA: CPT | Performed by: INTERNAL MEDICINE

## 2017-10-30 PROCEDURE — 82962 GLUCOSE BLOOD TEST: CPT

## 2017-10-30 PROCEDURE — 96372 THER/PROPH/DIAG INJ SC/IM: CPT

## 2017-10-30 PROCEDURE — 25010000002 FUROSEMIDE PER 20 MG: Performed by: INTERNAL MEDICINE

## 2017-10-30 PROCEDURE — 97167 OT EVAL HIGH COMPLEX 60 MIN: CPT

## 2017-10-30 PROCEDURE — G8988 SELF CARE GOAL STATUS: HCPCS

## 2017-10-30 RX ORDER — FLUTICASONE PROPIONATE 50 MCG
2 SPRAY, SUSPENSION (ML) NASAL DAILY
Status: DISCONTINUED | OUTPATIENT
Start: 2017-10-30 | End: 2017-11-01 | Stop reason: HOSPADM

## 2017-10-30 RX ORDER — FUROSEMIDE 40 MG/1
40 TABLET ORAL DAILY
Status: DISCONTINUED | OUTPATIENT
Start: 2017-10-31 | End: 2017-10-31

## 2017-10-30 RX ADMIN — LEVOTHYROXINE SODIUM 25 MCG: 25 TABLET ORAL at 09:36

## 2017-10-30 RX ADMIN — CARVEDILOL 12.5 MG: 6.25 TABLET, FILM COATED ORAL at 20:39

## 2017-10-30 RX ADMIN — POTASSIUM CHLORIDE 20 MEQ: 1500 TABLET, EXTENDED RELEASE ORAL at 09:37

## 2017-10-30 RX ADMIN — HEPARIN SODIUM 5000 UNITS: 5000 INJECTION, SOLUTION INTRAVENOUS; SUBCUTANEOUS at 09:37

## 2017-10-30 RX ADMIN — INSULIN DETEMIR 10 UNITS: 100 INJECTION, SOLUTION SUBCUTANEOUS at 18:34

## 2017-10-30 RX ADMIN — DONEPEZIL HYDROCHLORIDE 10 MG: 5 TABLET, FILM COATED ORAL at 09:36

## 2017-10-30 RX ADMIN — FLUTICASONE PROPIONATE 2 SPRAY: 50 SPRAY, METERED NASAL at 17:16

## 2017-10-30 RX ADMIN — INSULIN ASPART 2 UNITS: 100 INJECTION, SOLUTION INTRAVENOUS; SUBCUTANEOUS at 17:16

## 2017-10-30 RX ADMIN — CARVEDILOL 12.5 MG: 6.25 TABLET, FILM COATED ORAL at 09:37

## 2017-10-30 RX ADMIN — ATORVASTATIN CALCIUM 20 MG: 20 TABLET, FILM COATED ORAL at 09:37

## 2017-10-30 RX ADMIN — FUROSEMIDE 40 MG: 10 INJECTION, SOLUTION INTRAMUSCULAR; INTRAVENOUS at 09:37

## 2017-10-30 RX ADMIN — HEPARIN SODIUM 5000 UNITS: 5000 INJECTION, SOLUTION INTRAVENOUS; SUBCUTANEOUS at 20:39

## 2017-10-30 RX ADMIN — CYANOCOBALAMIN TAB 1000 MCG 1000 MCG: 1000 TAB at 09:37

## 2017-10-30 RX ADMIN — INSULIN ASPART 3 UNITS: 100 INJECTION, SOLUTION INTRAVENOUS; SUBCUTANEOUS at 20:39

## 2017-10-30 RX ADMIN — CITALOPRAM HYDROBROMIDE 10 MG: 20 TABLET ORAL at 09:37

## 2017-10-30 RX ADMIN — INSULIN ASPART 3 UNITS: 100 INJECTION, SOLUTION INTRAVENOUS; SUBCUTANEOUS at 11:33

## 2017-10-31 ENCOUNTER — APPOINTMENT (OUTPATIENT)
Dept: GENERAL RADIOLOGY | Facility: HOSPITAL | Age: 72
End: 2017-10-31

## 2017-10-31 LAB
ANION GAP SERPL CALCULATED.3IONS-SCNC: 6.7 MMOL/L (ref 3.6–11.2)
BASOPHILS # BLD AUTO: 0.04 10*3/MM3 (ref 0–0.3)
BASOPHILS NFR BLD AUTO: 0.4 % (ref 0–2)
BUN BLD-MCNC: 18 MG/DL (ref 7–21)
BUN/CREAT SERPL: 12.9 (ref 7–25)
CALCIUM SPEC-SCNC: 9.1 MG/DL (ref 7.7–10)
CHLORIDE SERPL-SCNC: 103 MMOL/L (ref 99–112)
CK SERPL-CCNC: 20 U/L (ref 24–173)
CO2 SERPL-SCNC: 30.3 MMOL/L (ref 24.3–31.9)
CREAT BLD-MCNC: 1.39 MG/DL (ref 0.43–1.29)
DEPRECATED RDW RBC AUTO: 43.7 FL (ref 37–54)
EOSINOPHIL # BLD AUTO: 0.32 10*3/MM3 (ref 0–0.7)
EOSINOPHIL NFR BLD AUTO: 3.3 % (ref 0–7)
ERYTHROCYTE [DISTWIDTH] IN BLOOD BY AUTOMATED COUNT: 13.9 % (ref 11.5–14.5)
GFR SERPL CREATININE-BSD FRML MDRD: 37 ML/MIN/1.73
GLUCOSE BLD-MCNC: 141 MG/DL (ref 70–110)
GLUCOSE BLDC GLUCOMTR-MCNC: 140 MG/DL (ref 70–130)
GLUCOSE BLDC GLUCOMTR-MCNC: 219 MG/DL (ref 70–130)
GLUCOSE BLDC GLUCOMTR-MCNC: 230 MG/DL (ref 70–130)
GLUCOSE BLDC GLUCOMTR-MCNC: 237 MG/DL (ref 70–130)
HCT VFR BLD AUTO: 35 % (ref 37–47)
HGB BLD-MCNC: 10.8 G/DL (ref 12–16)
IMM GRANULOCYTES # BLD: 0.02 10*3/MM3 (ref 0–0.03)
IMM GRANULOCYTES NFR BLD: 0.2 % (ref 0–0.5)
LYMPHOCYTES # BLD AUTO: 1.77 10*3/MM3 (ref 1–3)
LYMPHOCYTES NFR BLD AUTO: 18.1 % (ref 16–46)
MCH RBC QN AUTO: 26.5 PG (ref 27–33)
MCHC RBC AUTO-ENTMCNC: 30.9 G/DL (ref 33–37)
MCV RBC AUTO: 85.8 FL (ref 80–94)
MONOCYTES # BLD AUTO: 0.85 10*3/MM3 (ref 0.1–0.9)
MONOCYTES NFR BLD AUTO: 8.7 % (ref 0–12)
NEUTROPHILS # BLD AUTO: 6.78 10*3/MM3 (ref 1.4–6.5)
NEUTROPHILS NFR BLD AUTO: 69.3 % (ref 40–75)
NRBC BLD MANUAL-RTO: 0 /100 WBC (ref 0–0)
OSMOLALITY SERPL CALC.SUM OF ELEC: 283.7 MOSM/KG (ref 273–305)
PLATELET # BLD AUTO: 318 10*3/MM3 (ref 130–400)
PMV BLD AUTO: 9.8 FL (ref 6–10)
POTASSIUM BLD-SCNC: 4.1 MMOL/L (ref 3.5–5.3)
RBC # BLD AUTO: 4.08 10*6/MM3 (ref 4.2–5.4)
SODIUM BLD-SCNC: 140 MMOL/L (ref 135–153)
TROPONIN I SERPL-MCNC: <0.006 NG/ML
WBC NRBC COR # BLD: 9.78 10*3/MM3 (ref 4.5–12.5)

## 2017-10-31 PROCEDURE — 97116 GAIT TRAINING THERAPY: CPT

## 2017-10-31 PROCEDURE — 63710000001 INSULIN ASPART PER 5 UNITS: Performed by: INTERNAL MEDICINE

## 2017-10-31 PROCEDURE — 63710000001 INSULIN DETEMIR PER 5 UNITS: Performed by: INTERNAL MEDICINE

## 2017-10-31 PROCEDURE — G8996 SWALLOW CURRENT STATUS: HCPCS

## 2017-10-31 PROCEDURE — 93005 ELECTROCARDIOGRAM TRACING: CPT | Performed by: INTERNAL MEDICINE

## 2017-10-31 PROCEDURE — 80048 BASIC METABOLIC PNL TOTAL CA: CPT | Performed by: INTERNAL MEDICINE

## 2017-10-31 PROCEDURE — 97535 SELF CARE MNGMENT TRAINING: CPT

## 2017-10-31 PROCEDURE — 92610 EVALUATE SWALLOWING FUNCTION: CPT

## 2017-10-31 PROCEDURE — 92611 MOTION FLUOROSCOPY/SWALLOW: CPT

## 2017-10-31 PROCEDURE — 82962 GLUCOSE BLOOD TEST: CPT

## 2017-10-31 PROCEDURE — 82550 ASSAY OF CK (CPK): CPT | Performed by: INTERNAL MEDICINE

## 2017-10-31 PROCEDURE — 94799 UNLISTED PULMONARY SVC/PX: CPT

## 2017-10-31 PROCEDURE — G8997 SWALLOW GOAL STATUS: HCPCS

## 2017-10-31 PROCEDURE — 99225 PR SBSQ OBSERVATION CARE/DAY 25 MINUTES: CPT | Performed by: INTERNAL MEDICINE

## 2017-10-31 PROCEDURE — 74230 X-RAY XM SWLNG FUNCJ C+: CPT

## 2017-10-31 PROCEDURE — G0378 HOSPITAL OBSERVATION PER HR: HCPCS

## 2017-10-31 PROCEDURE — 74230 X-RAY XM SWLNG FUNCJ C+: CPT | Performed by: RADIOLOGY

## 2017-10-31 PROCEDURE — 25010000002 HEPARIN (PORCINE) PER 1000 UNITS: Performed by: INTERNAL MEDICINE

## 2017-10-31 PROCEDURE — 96372 THER/PROPH/DIAG INJ SC/IM: CPT

## 2017-10-31 PROCEDURE — 84484 ASSAY OF TROPONIN QUANT: CPT | Performed by: INTERNAL MEDICINE

## 2017-10-31 PROCEDURE — 85025 COMPLETE CBC W/AUTO DIFF WBC: CPT | Performed by: INTERNAL MEDICINE

## 2017-10-31 PROCEDURE — 93010 ELECTROCARDIOGRAM REPORT: CPT | Performed by: INTERNAL MEDICINE

## 2017-10-31 PROCEDURE — 97110 THERAPEUTIC EXERCISES: CPT

## 2017-10-31 RX ADMIN — POTASSIUM CHLORIDE 20 MEQ: 1500 TABLET, EXTENDED RELEASE ORAL at 08:09

## 2017-10-31 RX ADMIN — CARVEDILOL 12.5 MG: 6.25 TABLET, FILM COATED ORAL at 08:09

## 2017-10-31 RX ADMIN — DONEPEZIL HYDROCHLORIDE 10 MG: 5 TABLET, FILM COATED ORAL at 08:09

## 2017-10-31 RX ADMIN — HEPARIN SODIUM 5000 UNITS: 5000 INJECTION, SOLUTION INTRAVENOUS; SUBCUTANEOUS at 20:00

## 2017-10-31 RX ADMIN — INSULIN ASPART 3 UNITS: 100 INJECTION, SOLUTION INTRAVENOUS; SUBCUTANEOUS at 17:08

## 2017-10-31 RX ADMIN — HEPARIN SODIUM 5000 UNITS: 5000 INJECTION, SOLUTION INTRAVENOUS; SUBCUTANEOUS at 08:09

## 2017-10-31 RX ADMIN — INSULIN DETEMIR 10 UNITS: 100 INJECTION, SOLUTION SUBCUTANEOUS at 08:09

## 2017-10-31 RX ADMIN — FLUTICASONE PROPIONATE 2 SPRAY: 50 SPRAY, METERED NASAL at 08:11

## 2017-10-31 RX ADMIN — CYANOCOBALAMIN TAB 1000 MCG 1000 MCG: 1000 TAB at 08:09

## 2017-10-31 RX ADMIN — INSULIN ASPART 3 UNITS: 100 INJECTION, SOLUTION INTRAVENOUS; SUBCUTANEOUS at 20:01

## 2017-10-31 RX ADMIN — LEVOTHYROXINE SODIUM 25 MCG: 25 TABLET ORAL at 08:09

## 2017-10-31 RX ADMIN — CITALOPRAM HYDROBROMIDE 10 MG: 20 TABLET ORAL at 08:09

## 2017-10-31 RX ADMIN — INSULIN ASPART 3 UNITS: 100 INJECTION, SOLUTION INTRAVENOUS; SUBCUTANEOUS at 11:48

## 2017-10-31 RX ADMIN — CARVEDILOL 12.5 MG: 6.25 TABLET, FILM COATED ORAL at 20:01

## 2017-10-31 RX ADMIN — FUROSEMIDE 40 MG: 40 TABLET ORAL at 08:09

## 2017-10-31 RX ADMIN — ATORVASTATIN CALCIUM 20 MG: 20 TABLET, FILM COATED ORAL at 08:09

## 2017-11-01 VITALS
HEIGHT: 63 IN | RESPIRATION RATE: 18 BRPM | WEIGHT: 204.06 LBS | HEART RATE: 75 BPM | SYSTOLIC BLOOD PRESSURE: 127 MMHG | OXYGEN SATURATION: 95 % | TEMPERATURE: 98.8 F | DIASTOLIC BLOOD PRESSURE: 58 MMHG | BODY MASS INDEX: 36.16 KG/M2

## 2017-11-01 LAB
ALBUMIN SERPL-MCNC: 3.7 G/DL (ref 3.4–4.8)
ALBUMIN/GLOB SERPL: 1.2 G/DL (ref 1.5–2.5)
ALP SERPL-CCNC: 93 U/L (ref 35–104)
ALT SERPL W P-5'-P-CCNC: 19 U/L (ref 10–36)
ANION GAP SERPL CALCULATED.3IONS-SCNC: 7.3 MMOL/L (ref 3.6–11.2)
AST SERPL-CCNC: 24 U/L (ref 10–30)
BASOPHILS # BLD AUTO: 0.05 10*3/MM3 (ref 0–0.3)
BASOPHILS NFR BLD AUTO: 0.5 % (ref 0–2)
BILIRUB SERPL-MCNC: 0.4 MG/DL (ref 0.2–1.8)
BUN BLD-MCNC: 18 MG/DL (ref 7–21)
BUN/CREAT SERPL: 13.8 (ref 7–25)
CALCIUM SPEC-SCNC: 9 MG/DL (ref 7.7–10)
CHLORIDE SERPL-SCNC: 102 MMOL/L (ref 99–112)
CO2 SERPL-SCNC: 28.7 MMOL/L (ref 24.3–31.9)
CREAT BLD-MCNC: 1.3 MG/DL (ref 0.43–1.29)
CRP SERPL-MCNC: <0.5 MG/DL (ref 0–0.99)
DEPRECATED RDW RBC AUTO: 42.7 FL (ref 37–54)
EOSINOPHIL # BLD AUTO: 0.28 10*3/MM3 (ref 0–0.7)
EOSINOPHIL NFR BLD AUTO: 2.6 % (ref 0–7)
ERYTHROCYTE [DISTWIDTH] IN BLOOD BY AUTOMATED COUNT: 13.9 % (ref 11.5–14.5)
GFR SERPL CREATININE-BSD FRML MDRD: 40 ML/MIN/1.73
GLOBULIN UR ELPH-MCNC: 3.2 GM/DL
GLUCOSE BLD-MCNC: 215 MG/DL (ref 70–110)
GLUCOSE BLDC GLUCOMTR-MCNC: 154 MG/DL (ref 70–130)
GLUCOSE BLDC GLUCOMTR-MCNC: 208 MG/DL (ref 70–130)
GLUCOSE BLDC GLUCOMTR-MCNC: 278 MG/DL (ref 70–130)
HCT VFR BLD AUTO: 36.9 % (ref 37–47)
HGB BLD-MCNC: 11.5 G/DL (ref 12–16)
IMM GRANULOCYTES # BLD: 0.02 10*3/MM3 (ref 0–0.03)
IMM GRANULOCYTES NFR BLD: 0.2 % (ref 0–0.5)
LYMPHOCYTES # BLD AUTO: 1.83 10*3/MM3 (ref 1–3)
LYMPHOCYTES NFR BLD AUTO: 16.7 % (ref 16–46)
MCH RBC QN AUTO: 26.7 PG (ref 27–33)
MCHC RBC AUTO-ENTMCNC: 31.2 G/DL (ref 33–37)
MCV RBC AUTO: 85.8 FL (ref 80–94)
MONOCYTES # BLD AUTO: 0.9 10*3/MM3 (ref 0.1–0.9)
MONOCYTES NFR BLD AUTO: 8.2 % (ref 0–12)
NEUTROPHILS # BLD AUTO: 7.89 10*3/MM3 (ref 1.4–6.5)
NEUTROPHILS NFR BLD AUTO: 71.8 % (ref 40–75)
OSMOLALITY SERPL CALC.SUM OF ELEC: 284.1 MOSM/KG (ref 273–305)
PLATELET # BLD AUTO: 361 10*3/MM3 (ref 130–400)
PMV BLD AUTO: 9.6 FL (ref 6–10)
POTASSIUM BLD-SCNC: 3.9 MMOL/L (ref 3.5–5.3)
PROT SERPL-MCNC: 6.9 G/DL (ref 6–8)
RBC # BLD AUTO: 4.3 10*6/MM3 (ref 4.2–5.4)
SODIUM BLD-SCNC: 138 MMOL/L (ref 135–153)
WBC NRBC COR # BLD: 10.97 10*3/MM3 (ref 4.5–12.5)

## 2017-11-01 PROCEDURE — 63710000001 INSULIN ASPART PER 5 UNITS: Performed by: INTERNAL MEDICINE

## 2017-11-01 PROCEDURE — 96372 THER/PROPH/DIAG INJ SC/IM: CPT

## 2017-11-01 PROCEDURE — 63710000001 INSULIN DETEMIR PER 5 UNITS: Performed by: INTERNAL MEDICINE

## 2017-11-01 PROCEDURE — 97110 THERAPEUTIC EXERCISES: CPT

## 2017-11-01 PROCEDURE — 99217 PR OBSERVATION CARE DISCHARGE MANAGEMENT: CPT | Performed by: INTERNAL MEDICINE

## 2017-11-01 PROCEDURE — 94799 UNLISTED PULMONARY SVC/PX: CPT

## 2017-11-01 PROCEDURE — 97116 GAIT TRAINING THERAPY: CPT

## 2017-11-01 PROCEDURE — G0378 HOSPITAL OBSERVATION PER HR: HCPCS

## 2017-11-01 PROCEDURE — 80053 COMPREHEN METABOLIC PANEL: CPT | Performed by: INTERNAL MEDICINE

## 2017-11-01 PROCEDURE — 86140 C-REACTIVE PROTEIN: CPT | Performed by: INTERNAL MEDICINE

## 2017-11-01 PROCEDURE — 85025 COMPLETE CBC W/AUTO DIFF WBC: CPT | Performed by: INTERNAL MEDICINE

## 2017-11-01 PROCEDURE — 82962 GLUCOSE BLOOD TEST: CPT

## 2017-11-01 PROCEDURE — 25010000002 HEPARIN (PORCINE) PER 1000 UNITS: Performed by: INTERNAL MEDICINE

## 2017-11-01 RX ORDER — CARVEDILOL 12.5 MG/1
12.5 TABLET ORAL EVERY 12 HOURS SCHEDULED
Qty: 60 TABLET | Refills: 0 | Status: SHIPPED | OUTPATIENT
Start: 2017-11-01 | End: 2017-11-01

## 2017-11-01 RX ORDER — CARVEDILOL 12.5 MG/1
12.5 TABLET ORAL 2 TIMES DAILY WITH MEALS
Qty: 60 TABLET | Refills: 0 | Status: SHIPPED | OUTPATIENT
Start: 2017-11-01

## 2017-11-01 RX ADMIN — DONEPEZIL HYDROCHLORIDE 10 MG: 5 TABLET, FILM COATED ORAL at 08:00

## 2017-11-01 RX ADMIN — FLUTICASONE PROPIONATE 2 SPRAY: 50 SPRAY, METERED NASAL at 08:01

## 2017-11-01 RX ADMIN — INSULIN ASPART 2 UNITS: 100 INJECTION, SOLUTION INTRAVENOUS; SUBCUTANEOUS at 07:48

## 2017-11-01 RX ADMIN — INSULIN DETEMIR 15 UNITS: 100 INJECTION, SOLUTION SUBCUTANEOUS at 07:50

## 2017-11-01 RX ADMIN — CYANOCOBALAMIN TAB 1000 MCG 1000 MCG: 1000 TAB at 08:00

## 2017-11-01 RX ADMIN — INSULIN ASPART 4 UNITS: 100 INJECTION, SOLUTION INTRAVENOUS; SUBCUTANEOUS at 10:56

## 2017-11-01 RX ADMIN — CARVEDILOL 12.5 MG: 6.25 TABLET, FILM COATED ORAL at 08:00

## 2017-11-01 RX ADMIN — HEPARIN SODIUM 5000 UNITS: 5000 INJECTION, SOLUTION INTRAVENOUS; SUBCUTANEOUS at 08:00

## 2017-11-01 RX ADMIN — CITALOPRAM HYDROBROMIDE 10 MG: 20 TABLET ORAL at 08:00

## 2017-11-01 RX ADMIN — ATORVASTATIN CALCIUM 20 MG: 20 TABLET, FILM COATED ORAL at 08:01

## 2017-11-01 RX ADMIN — LEVOTHYROXINE SODIUM 25 MCG: 25 TABLET ORAL at 08:00

## 2017-11-01 NOTE — THERAPY TREATMENT NOTE
Acute Care - Physical Therapy Treatment Note   [place]     Patient Name: Lydia Brumfield  : 1945  MRN: [id number]  Today's Date: 2017  Onset of Illness/Injury or Date of Surgery Date: 10/27/17 (admit date)  Date of Referral to PT: 10/29/17  Referring Physician: Arabella    Admit Date: 10/27/2017    Visit Dx:    ICD-10-CM ICD-9-CM   1. Dementia with behavioral disturbance, unspecified dementia type F03.91 294.21   2. Shortness of breath R06.02 786.05     Patient Active Problem List   Diagnosis   • Dyspnea               Adult Rehabilitation Note       17 1008 10/31/17 1258 10/31/17 1117    Rehab Assessment/Intervention    Discipline physical therapist  -CT occupational therapist  -KR physical therapist  -CT    Document Type therapy note (daily note)  -CT therapy note (daily note)  -KR therapy note (daily note)  -CT    Subjective Information agree to therapy;complains of;weakness  -CT agree to therapy;complains of;weakness  -KR agree to therapy  -CT    Patient Effort, Rehab Treatment good  -CT good  -KR good  -CT    Symptoms Noted During/After Treatment   fatigue  -CT    Precautions/Limitations fall precautions  -CT  fall precautions  -CT    Patient Response to Treatment Pt tolerated treatment session well and continues to progress with therapy. Pt is SBA with gait at this time.   -CT  Pt tolerated treatment session well with rest breaks provided as needed. Pt reports decreased swelling in BLE today.   -CT    Recorded by [CT] Lupe Hays, PT [KR] Panda Diaz OT [CT] Lupe Hays, PAULA    Pain Assessment    Pain Assessment No/denies pain  -CT  No/denies pain  -CT    Recorded by [CT] Lupe Hays, PT  [CT] Lupe Hays, PT    Cognitive Assessment/Intervention    Current Cognitive/Communication Assessment functional  -CT  functional  -CT    Orientation Status oriented x 4  -CT  oriented x 4  -CT    Follows Commands/Answers Questions able to follow multi-step instructions;100% of the time  -CT  able  to follow single-step instructions;100% of the time  -CT    Personal Safety decreased awareness, need for assist;decreased awareness, need for safety  -CT  decreased awareness, need for assist;decreased awareness, need for safety  -CT    Personal Safety Interventions fall prevention program maintained;gait belt;muscle strengthening facilitated;nonskid shoes/slippers when out of bed  -CT  fall prevention program maintained;gait belt;muscle strengthening facilitated;nonskid shoes/slippers when out of bed  -CT    Recorded by [CT] Lupe Hays PT  [CT] Lupe Hays PT    Bed Mobility, Assessment/Treatment    Bed Mobility, Comment deferred pt up in bedside chair  -CT  deferred pt up in chair  -CT    Recorded by [CT] Lupe Hays PT  [CT] Lupe Hays PT    Transfer Assessment/Treatment    Transfers, Sit-Stand [place] contact guard assist  -CT  minimum assist (75% patient effort)  -CT    Transfers, Stand-Sit [place] contact guard assist  -CT  minimum assist (75% patient effort)  -CT    Transfers, Sit-Stand-Sit, Assist Device rolling walker  -CT  rolling walker  -CT    Transfer, Impairments strength decreased  -CT  strength decreased;impaired balance  -CT    Recorded by [CT] Lupe Hays PT  [CT] Lupe Hays PT    Gait Assessment/Treatment    Gait, [place] Level stand by assist;contact guard assist;verbal cues required  -CT  contact guard assist;verbal cues required;nonverbal cues required (demo/gesture)  -CT    Gait, Assistive Device rolling walker  -CT  rolling walker  -CT    Gait, Distance (Feet) 300  -CT  300  -CT    Gait, Gait Pattern Analysis swing-to gait  -CT  swing-to gait  -CT    Gait, Impairments strength decreased;impaired balance  -CT  strength decreased;impaired balance  -CT    Recorded by [CT] Lupe Hays, PT  [CT] Lupe Hays PT    ADL Assessment/Intervention    Additional Documentation  Self-Feeding Assessment/Training (Group)  -KR     Recorded by  [KR] Panda PINA  HARVINDER Diaz     Self-Feeding Assessment/Training    Self-Feeding Assess/Train, [place]  set up required  -KR     Recorded by  [KR] Panda Diaz OT     Therapy Exercises    Bilateral Lower Extremities AROM:;10 reps;sitting  -CT  AROM:;10 reps;sitting  -CT    Recorded by [CT] Lupe Hays, PT  [CT] Lupe Hays PT    Positioning and Restraints    Pre-Treatment Position sitting in chair/recliner  -CT  sitting in chair/recliner  -CT    Post Treatment Position chair  -CT  chair  -CT    In Chair sitting;call light within reach;encouraged to call for assist;notified nsg  -CT  sitting;call light within reach;encouraged to call for assist;notified nsg  -CT    Recorded by [CT] Lupe Hays, PT  [CT] Lupe Hays PT      User Key  (r) = Recorded By, (t) = Taken By, (c) = Cosigned By    Initials Name Effective Dates    KR Panda Diaz, OT 03/14/16 -     CT Lupe Hays PT 03/14/16 -                 IP PT Goals       10/30/17 1631          Transfer Training PT LTG    Transfer Training PT LTG, Date Established 10/30/17  -CT      Transfer Training PT LTG, Time to Achieve by discharge  -CT      Transfer Training PT LTG, Activity Type bed to chair /chair to bed;sit to stand/stand to sit  -CT      Transfer Training PT LTG, [place] Level supervision required;contact guard assist  -CT      Transfer Training PT LTG, Assist Device other (see comments)   with appropriate AD  -CT      Gait Training PT LTG    Gait Training Goal PT LTG, Date Established 10/30/17  -CT      Gait Training Goal PT LTG, Time to Achieve by discharge  -CT      Gait Training Goal PT LTG, [place] Level supervision required;contact guard assist  -CT      Gait Training Goal PT LTG, Assist Device other (see comments)   with appropriate AD  -CT      Gait Training Goal PT LTG, Distance to Achieve 250  -CT        User Key  (r) = Recorded By, (t) = Taken By, (c) = Cosigned By    Initials Name Provider Type    CT Lupe Hays, PT Physical  Therapist          Physical Therapy Education     Title: PT OT SLP Therapies (Active)     Topic: Physical Therapy (Done)     Point: Mobility training (Done)    Learning Progress Summary    Learner Readiness Method Response Comment Documented by Status   Patient Acceptance E VU  CT 11/01/17 1012 Done    Eager E NR  KR 10/31/17 1259 Active    Acceptance E VU  CT 10/31/17 1121 Done    Acceptance E VU  CT 10/30/17 1632 Done               Point: Home exercise program (Done)    Learning Progress Summary    Learner Readiness Method Response Comment Documented by Status   Patient Acceptance E VU  CT 11/01/17 1012 Done    Eager E NR  KR 10/31/17 1259 Active    Acceptance E VU  CT 10/31/17 1121 Done    Acceptance E VU  CT 10/30/17 1632 Done               Point: Body mechanics (Done)    Learning Progress Summary    Learner Readiness Method Response Comment Documented by Status   Patient Acceptance E VU  CT 11/01/17 1012 Done    Eager E NR  KR 10/31/17 1259 Active    Acceptance E VU  CT 10/31/17 1121 Done    Acceptance E VU  CT 10/30/17 1632 Done               Point: Precautions (Done)    Learning Progress Summary    Learner Readiness Method Response Comment Documented by Status   Patient Acceptance E VU  CT 11/01/17 1012 Done    Eager E NR  KR 10/31/17 1259 Active    Acceptance E VU  CT 10/31/17 1121 Done    Acceptance E VU  CT 10/30/17 1632 Done                      User Key     Initials Effective Dates Name Provider Type Discipline    KR 03/14/16 -  Panda Diaz, OT Occupational Therapist OT    CT 03/14/16 -  Lupe Hays, PT Physical Therapist PT                    PT Recommendation and Plan  Anticipated Equipment Needs At Discharge: front wheeled walker  Anticipated Discharge Disposition: home with assist, home with home health  Planned Therapy Interventions: balance training, bed mobility training, gait training, home exercise program, neuromuscular re-education, patient/family education, postural re-education,  strengthening, transfer training  PT Frequency: 3-5 times/wk, per priority policy             Outcome Measures       11/01/17 1000 10/31/17 1200 10/30/17 1600    How much help from another person do you currently need...    Turning from your back to your side while in flat bed without using bedrails? 4  -CT  4  -CT    Moving from lying on back to sitting on the side of a flat bed without bedrails? 3  -CT  3  -CT    Moving to and from a bed to a chair (including a wheelchair)? 3  -CT  3  -CT    Standing up from a chair using your arms (e.g., wheelchair, bedside chair)? 4  -CT  3  -CT    Climbing 3-5 steps with a railing? 3  -CT  2  -CT    To walk in hospital room? 4  -CT  3  -CT    AM-PAC 6 Clicks Score 21  -CT  18  -CT    Functional Assessment    Outcome Measure Options AM-PAC 6 Clicks Basic Mobility (PT)  -CT AM-PAC 6 Clicks Daily Activity (OT)  -KR AM-PAC 6 Clicks Basic Mobility (PT)  -CT      10/30/17 1253          How much help from another is currently needed...    Putting on and taking off regular lower body clothing? 2  -KR      Bathing (including washing, rinsing, and drying) 3  -KR      Toileting (which includes using toilet bed pan or urinal) 2  -KR      Putting on and taking off regular upper body clothing 3  -KR      Taking care of personal grooming (such as brushing teeth) 3  -KR      Eating meals 4  -KR      Score 17  -KR        User Key  (r) = Recorded By, (t) = Taken By, (c) = Cosigned By    Initials Name Provider Type    ALLEN Diaz OT Occupational Therapist    ALAYNA Hays, PAULA Physical Therapist           Time Calculation:         PT Charges       11/01/17 1013          Time Calculation    PT Received On 11/01/17  -CT      PT - Next Appointment 11/02/17  -CT      PT Goal Re-Cert Due Date 11/13/17  -CT      Time Calculation- PT    Total Timed Code Minutes- PT 26 minute(s)  -CT        User Key  (r) = Recorded By, (t) = Taken By, (c) = Cosigned By    Initials Name Provider Type    ALAYNA Andrade  Saúl, PT Physical Therapist          Therapy Charges for Today     Code Description Service Date Service Provider Modifiers Qty    00523962458 HC GAIT TRAINING EA 15 MIN 10/31/2017 Lupe Hays, PT GP 1    82135752312 HC PT THER PROC EA 15 MIN 10/31/2017 Lupe Hays, PT GP 1    62053528555 HC PT THER SUPP EA 15 MIN 10/31/2017 Lupe Hays, PT GP 2    86792141886 HC GAIT TRAINING EA 15 MIN 11/1/2017 Lupe Hays, PT GP 1    05394012909 HC PT THER PROC EA 15 MIN 11/1/2017 Lupe Hays, PT GP 1    44925619544 HC PT THER SUPP EA 15 MIN 11/1/2017 Lupe Hays, PT GP 2          PT G-Codes  Outcome Measure Options: AM-PAC 6 Clicks Basic Mobility (PT)  Score: 18  Functional Limitation: Mobility: Walking and moving around  Mobility: Walking and Moving Around Current Status (): At least 40 percent but less than 60 percent impaired, limited or restricted  Mobility: Walking and Moving Around Goal Status (): At least 20 percent but less than 40 percent impaired, limited or restricted    Lupe Hays, PT  11/1/2017

## 2017-11-01 NOTE — DISCHARGE SUMMARY
Date of admission: 10/27/17  Date of discharge: 11/1/17    Principal diagnosis: Diastolic congestive heart failure acute exacerbation superimposed on chronic heart failure    Secondary diagnosis:  -Resolving pneumonia status post recent hospitalization at an outlying facility  -Diagnosis of dementia the patient did very well on MMSE Aricept being stopped  -Diabetes, the patient's tendency towards acute kidney injury metformin being stopped, I discussed insulin with her but she wishes to try Januvia, hemoglobin A1c suggests poor control at home.  -Hypothyroidism  -Borderline B12 at a level of 219 being supplemented orally  -Enlarged mediastinal lymph node of uncertain significance recommendation repeat CT in one month. (A note has been sent with the patient to get her primary pertaining to this also we have faxed a copy this to the primary office.)  -Obesity  -Hypertension  -Prob CKD 3  -Functional decline    Consultants: None    Procedures: None    Admission diagnosis: See history and physical    Exam: Assisted by Nara DIAMOND patient is sitting in a reclining chair, she states she's breathing well, she has walked twice with therapy and she did get the elevator.  No chest pain no shortness of breath bowels are moving.  She is tolerating her diet.  Vital signs: 127/58, 18, 75, 98.8.  Monitor showing a sinus rhythm  Lungs have bilateral breath sounds but overall clear today without rhonchi as well as rales or wheezing, heart regular rate and rhythm without murmur rub or gallop, abdomen is benign, trace ankle edema only is seen.  No cyanosis.  Patient is alert and oriented completely.  This includes day date president [employer] state place immediate and delayed recall 3/3, naming intact, 3 step command was not checked.    Hospital course: Patient was admitted here after apparently patient had been admitted outlying hospital and was to go to nursing facility but apparently this did not get approved.  She was brought here.  She  was complaining of still dyspnea and weakness.  BNP was only mildly elevated and chest x-ray showed questionable congestive failure.  She was given diuresis here and therapy.  With these she has improved actually significantly.  Will diuresis however creatinine did go up to 1.39, his back down 1.30 today and no fluids were given only diuretics were stopped.  I've asked home health in 2 days to check labs and call this to her primary care provider Dr. Schumacher.  I did check a CT of her chest did show some small effusions and some right basilar consolidation.  There was thought this was just a resolving pneumonia that she had previously been treated for.  She was not febrile here.  Her CRP is less than 0.5 and her white count is normal.  She did have this enlarged lymph node which may have been reactive from her pneumonia.  She was told about this and told that she did get a repeat CT in one month.  He has sent a note accordingly to her primary care provider as well as with the patient stated she needed to get this rechecked.  Recommend CT chest 1 month.  We were attempting to get her in a nursing facility for short-term rehabilitation however insurance approval is pending and since the patient has improved significantly and wants to go home and is fully oriented patient will be discharged for outpatient follow-up.  Her glucoses are not well controlled.  I did discuss insulin with her what she does not want this time, I think with her chronic kidney disease she is at high risk for metformin therapy which is what she was on therefore I'm going to try her on Januvia.  This can be further evaluated by her primary care provider which she is to see in one week.  Echocardiogram showed a normal ejection fraction.  EKGs were nonspecific but troponins negative ×2.  CPK was normal.  B12 was borderline low and this will be supplemented orally.  As above she carries a diagnosis of Alzheimer's however she was complaining of  decreased appetite and since she did very well on her Mini-Mental status this would bring into question the diagnosis of Alzheimer's or at least at this time Aricept has been stopped, for primary care provider feels like she needs this from previous experience certainly this can be restarted at his discretion.    Condition on discharge stable, improved.    Follow-up: Home health with physical, occupational therapies and nurse.  I have recommended CMP and a CBC in 2 days to be called to her primary care provider .  Also recommending repeat CT one month for chest as above.    Follow-up  one week    Diet: Constant carbohydrate    Medications:  -Coreg 12.5 mg twice a day  -Vitamin B12 1000 mg daily  -Januvia 50 mg daily  -Atorvastatin 20 mg a day  -Celexa 10 mg a day  -DuoNeb when necessary 3 times a day as needed  -Synthroid 25 µg a day    ADDENDUM:(HH would not except referral so patient wished to go home anyway and she was told to go to PCP for labs)    35 min discharge

## 2017-11-01 NOTE — DISCHARGE PLACEMENT REQUEST
"Roderick Serrano (72 y.o. Female)     Date of Birth Social Security Number Address Home Phone MRN    1945  [address] [id number] [phone] [id number]    Ep[employer]op[employer] Marital Status          None        Admission Date Admission Type Admitting Provider Attending Provider Department, Room/Bed    10/27/17 Emergency Zeenat, DO Arabella Hernandez Hafiz Sarfraz, MD [address], [address]/    Discharge Date Discharge Disposition Discharge Destination         Home or Self Care             Attending Provider: Apryl Bell MD     Allergies:  Penicillins, Sulfa Antibiotics    Isolation:  None   Infection:  None   Code Status:  FULL    Ht:  63\" (160 cm)   Wt:  204 lb 1 oz (92.6 kg)    Admission Cmt:  None   Principal Problem:  None                Active Insurance as of 10/27/2017     Primary Coverage     Payor Plan Insurance Group Employer/Plan Group    HUMANA MEDICARE REPLACEMENT HUMANA MEDICARE REPL [id number]     Payor Plan Address Payor Plan Phone Number Effective From Effective To    [address] [address] [phone] 2012     [place] [address]       Subscriber Name Subscriber Birth Date Member ID       RODERICK SERRANO 1945 [id number]                 Emergency Contacts      (Rel.) Home Phone Work Phone Mobile Phone    JoseyAlfred (Relative) [phone] -- --        [address] [address]  Phone:  [phone]  Fax:   Date: 2017      Referral to Home Health     Patient:  Roderick Serrano MRN:  [id number]   [address] [id number] :  1945  SSN:    Phone: [phone] Sex:  F      INSURANCE PAYOR PLAN GROUP # SUBSCRIBER ID   Primary: HUMANA MEDICARE REPLACEMENT [id number]      Referring Provider Information:  ROSAS JOHNSON Phone: [phone] Fax:       Referral Information:   # Visits:  1 Referral Type: Home Health [42]   Urgency:  " Routine Referral Reason: Specialty Services Required   Start Date: Nov 1, 2017 End Date:  To be determined by Insurer   Diagnosis: Weakness (R53.1 [ICD-10-CM] 780.79 [ICD-9-CM])  Dyspnea on exertion (R06.09 [ICD-10-CM] 786.09 [ICD-9-CM])      Refer to Dept:   Refer to Provider:   Refer to Facility:       Face to Face Visit Date: 11/1/2017  Follow-up Provider for Plan of Care? I treated the patient in an acute care facility and will not continue treatment after discharge.  Follow-up Provider: LUCAS BLANCAS [[id number]]  Reason/Clinical Findings: Homebound  Describe mobility limitations that make leaving home difficult: functional decline  Nursing/Therapeutic Services Requested: Skilled Nursing  Nursing/Therapeutic Services Requested: Physical Therapy  Nursing/Therapeutic Services Requested: Occupational Therapy  Skilled nursing orders: Medication education (CMP/CBC in 2 days (11/3) and call to Dr Schumacher)  Skilled nursing orders: CHF management  Skilled nursing orders: Cardiopulmonary assessments  PT orders: Transfer training  PT orders: Strengthening  Occupational orders: Activities of daily living  Occupational orders: Strengthening  Frequency: 1 Week 1     This document serves as a request of services and does not constitute Insurance authorization or approval of services.  To determine eligibility, please contact the members Insurance carrier to verify and review coverage.     If you have medical questions regarding this request for services. Please contact [address] at [phone] between the hours of 8:00am - 5:00pm (Mon-Fri).             Authorizing Provider:Deniz Gonsalves MD  Authorizing Provider's NPI: [id number]  Order Entered By: Deniz Gonsalves MD 11/1/2017  2:01 PM     Electronically signed by: Deniz Gonsalves MD 11/1/2017  2:01 PM                History & Physical      Usha Zacarias Epperson DO at 10/28/2017  3:58 AM          Hospitalist History and Physical    Patient  "Identification:  Name: Lydia Brumfield  Age/Sex: 72 y.o. female  :  1945  MRN: [id number]         Primary Care Physician: MARIA LUZ    Chief Complaint   Patient presents with   • Shortness of Breath   • Altered Mental Status       History of Present Illness  Patient is a 72 y.o. female presents with the following: Shortness of breath    The patient is a 72-year-old female with past medical history significant for hypothyroidism, hypertension and diabetes who presents to the emergency department for ongoing shortness of breath.    Patient was admitted at [facility] from  through  where she was treated for a multitude of conditions to include severe hypokalemia (potassium level of 1.6) secondary to severe diarrhea, altered mental status, colitis for which she was prescribed Levaquin and Flagyl in addition to respiratory failure thought to be related to a congestive heart failure exacerbation.      According to the records from the above-mentioned hospital, the patient was to be discharged to a local skilled nursing facility for rehabilitation.  Apparently the patient's insurance denied approval for this so they discharged the patient home with home health.  The patient states that upon arriving home she did feel somewhat short of breath but admits that she had been more active yesterday that she had been in quite some time.  The patient states that her diarrhea initially began several weeks ago/approximately one month ago and she states that her diarrhea was so severe and she was so weak that she \"let her house get out of hand.\"  According to the emergency department records, the patient's home health nurse did visit her yesterday and found the house deplorable.    The patient was brought to the emergency department where she does continue to complain of dyspnea on exertion.  She also complains of generalized weakness.  She denies chest pain.  She denies recent nausea, vomiting " and/or diarrhea (all improved prior to discharge from the outside hospital).    Present during visit: DARA Urbano    Past History:  Past Medical History:   Diagnosis Date   • Dementia    • Depression    • Diabetes mellitus    • Disease of thyroid gland     Hypothyroidism   • HLD (hyperlipidemia)    • Hypertension    • Sleep apnea     compliant with cpap     Past Surgical History:   Procedure Laterality Date   • CHOLECYSTECTOMY     • REPLACEMENT TOTAL KNEE BILATERAL Bilateral    • TONSILLECTOMY       Family History   Problem Relation Age of Onset   • ADD / ADHD Neg Hx    • Alcohol abuse Neg Hx    • Anxiety disorder Neg Hx    • Bipolar disorder Neg Hx    • Dementia Neg Hx    • Depression Neg Hx    • Drug abuse Neg Hx    • OCD Neg Hx    • Paranoid behavior Neg Hx    • Schizophrenia Neg Hx    • Seizures Neg Hx    • Self-Injurious Behavior  Neg Hx    • Suicide Attempts Neg Hx      Social History   Substance Use Topics   • Smoking status: Never Smoker   • Smokeless tobacco: Never Used   • Alcohol use No     Prescriptions Prior to Admission   Medication Sig Dispense Refill Last Dose   • atorvastatin (LIPITOR) 20 MG tablet Take 20 mg by mouth Daily.   Unknown at Unknown time   • citalopram (CeleXA) 10 MG tablet Take 10 mg by mouth Daily.   Unknown at Unknown time   • donepezil (ARICEPT) 10 MG tablet Take 10 mg by mouth Daily.   Unknown at Unknown time   • furosemide (LASIX) 20 MG tablet Take 20 mg by mouth Daily.   Unknown at Unknown time   • ipratropium-albuterol (DUO-NEB) 0.5-2.5 mg/mL nebulizer Take 3 mL by nebulization 3 (Three) Times a Day As Needed for Wheezing.   Unknown at Unknown time   • levoFLOXacin (LEVAQUIN) 250 MG tablet Take 250 mg by mouth Daily.   Unknown at Unknown time   • levothyroxine (SYNTHROID, LEVOTHROID) 25 MCG tablet Take 25 mcg by mouth Daily.   Unknown at Unknown time   • losartan-hydrochlorothiazide (HYZAAR) 100-25 MG per tablet Take 1 tablet by mouth Daily.   Unknown at Unknown time   • metFORMIN  ER (GLUCOPHAGE-XR) 500 MG 24 hr tablet Take 500 mg by mouth 2 (Two) Times a Day.   Unknown at Unknown time   • metroNIDAZOLE (FLAGYL) 250 MG tablet Take 250 mg by mouth 3 (Three) Times a Day.   Unknown at Unknown time   • potassium chloride (K-DUR) 10 MEQ CR tablet Take 10 mEq by mouth Daily.   Unknown at Unknown time     Allergies: Penicillins and Sulfa antibiotics    Review of Systems:  Review of Systems   Constitutional: Negative for chills, diaphoresis and fever.   HENT: Negative for hearing loss, tinnitus and trouble swallowing.    Eyes: Negative for photophobia, discharge and visual disturbance.   Respiratory: Positive for shortness of breath. Negative for cough and wheezing.    Cardiovascular: Positive for leg swelling. Negative for chest pain and palpitations.   Gastrointestinal: Negative for abdominal pain, constipation, diarrhea, nausea and vomiting.   Endocrine: Negative for polydipsia, polyphagia and polyuria.   Genitourinary: Negative for dysuria, frequency and hematuria.   Musculoskeletal: Negative for gait problem, myalgias and neck pain.   Skin: Negative for rash.   Neurological: Positive for weakness. Negative for dizziness, tremors, seizures, syncope and light-headedness.   Hematological: Does not bruise/bleed easily.   Psychiatric/Behavioral: Negative for confusion, hallucinations and suicidal ideas.      Vital Signs  Temp:  [98.2 °F (36.8 °C)-99.8 °F (37.7 °C)] 98.2 °F (36.8 °C)  Heart Rate:  [79-95] 79  Resp:  [18-20] 20  BP: (163-173)/(68-98) 163/91  Body mass index is 37.87 kg/(m^2).    Physical Exam:  Physical Exam   Constitutional: She is oriented to person, place, and time. She appears well-developed and well-nourished. No distress. Nasal cannula in place.   HENT:   Head: Normocephalic and atraumatic.   Nose: Nose normal.   Mouth/Throat: Oropharynx is clear and moist and mucous membranes are normal.   Eyes: Conjunctivae and EOM are normal. Pupils are equal, round, and reactive to light. No  scleral icterus.   Neck: Trachea normal. Neck supple. No JVD present. Carotid bruit is not present. No thyromegaly present.   Cardiovascular: Normal rate, regular rhythm, normal heart sounds and normal pulses.  Exam reveals no gallop and no friction rub.    No murmur heard.  2+ edema bilateral lower extremities   Pulmonary/Chest: Effort normal. No respiratory distress. She has wheezes in the right upper field and the left upper field. She has no rales.   Abdominal: Soft. Bowel sounds are normal. She exhibits no distension. There is no tenderness. There is no guarding.   Musculoskeletal: Normal range of motion.   Neurological: She is alert and oriented to person, place, and time. She has normal strength. No cranial nerve deficit.   Skin: Skin is warm, dry and intact. No rash noted. No erythema.   Psychiatric: She has a normal mood and affect. Her speech is normal.     Results Review:    Results from last 7 days  Lab Units 10/28/17  0336 10/27/17  2128   WBC 10*3/mm3 8.78 9.61   HEMOGLOBIN g/dL 10.5* 10.4*   HEMATOCRIT % 34.5* 34.4*   PLATELETS 10*3/mm3 345 375       Results from last 7 days  Lab Units 10/28/17  0336 10/27/17  2128   SODIUM mmol/L 145 143   POTASSIUM mmol/L 3.4* 4.0   CHLORIDE mmol/L 104 105   CO2 mmol/L 34.8* 32.8*   BUN mg/dL 10 11   CREATININE mg/dL 1.28 1.14   CALCIUM mg/dL 9.0 9.1   GLUCOSE mg/dL 185* 144*       Results from last 7 days  Lab Units 10/28/17  0336 10/27/17  2128   BILIRUBIN mg/dL 0.5 0.4   ALK PHOS U/L 107* 109*   AST (SGOT) U/L 17 21   ALT (SGPT) U/L 13 18       Results from last 7 days  Lab Units 10/27/17  2128   TROPONIN I ng/mL <0.006       Imaging:    I have personally reviewed the EKG.    Imaging Results (most recent)     Procedure Component Value Units Date/Time    XR Chest 1 View [[id number]] Resulted:  10/27/17 2258     Updated:  10/27/17 2259      Official report pending; per my view the patient has bilateral effusions    Assessment/Plan      -Dyspnea that may be secondary  to an acute CHF exacerbation (details unknown)  -Generalized weakness and debility  -Uncontrolled hypertension  -Diabetes mellitus type II currently controlled  -Mild hypokalemia  -Presumed CKD  -Normocytic anemia with unknown baseline  -Questionable history dementia  -Recent prolonged hospitalization for severe hypokalemia in the setting of diarrhea and colitis    The patient has been admitted to the medical telemetry floor. She will receive IV lasix. I will supplement her potassium. I have ordered an echocardiogram. I have placed her on as needed IV hydralazine while awaiting her home medication list. I will place her on accuchecks and SSI. She will need a PT consult when available.  has been consulted for discharge planning. I will obtain a TSH, Vitamin B12 and Folate level. I will repeat her CBC and CMP.     DVT prophylaxis: Subcutaneous heparin    Estimated Length of Stay: > 2 MNs    I discussed the patients findings and my recommendations with patient and nursing staff      Usha Epperson DO  10/28/17  7:10 AM     Electronically signed by Usha Epperson DO at 10/28/2017  7:59 AM        Vital Signs (last 24 hours)       10/31 0700  -  11/01 0659 11/01 0700  -  11/01 1417   Most Recent    Temp (°F) 97.8 -  98.6    98.3 -  98.8     98.8 (37.1)    Heart Rate 76 -  88    75 -  81     75    Resp 18 -  20    18 -  20     18    /74 -  165/73    127/58 -  162/85     127/58    SpO2 (%) 93 -  98      95     95          Intake & Output (last day)       10/31 0701 - 11/01 0700 11/01 0701 - 11/02 0700    P.O. 1700 840    Total Intake(mL/kg) 1700 (18.4) 840 (9.1)    Urine (mL/kg/hr) 1850 (0.8)     Stool 0 (0)     Total Output 1850      Net -150 +840          Unmeasured Stool Occurrence 2 x         Hospital Medications (active)       Dose Frequency Start End    atorvastatin (LIPITOR) tablet 20 mg 20 mg Daily 10/28/2017     Sig - Route: Take 1 tablet by mouth Daily. - Oral    carvedilol (COREG)  tablet 12.5 mg 12.5 mg Every 12 Hours Scheduled 10/29/2017     Sig - Route: Take 2 tablets by mouth Every 12 (Twelve) Hours. - Oral    citalopram (CeleXA) tablet 10 mg 10 mg Daily 10/28/2017     Sig - Route: Take 0.5 tablets by mouth Daily. - Oral    dextrose (D50W) solution 25 g 25 g Every 15 Minutes PRN 10/28/2017     Sig - Route: Infuse 50 mL into a venous catheter Every 15 (Fifteen) Minutes As Needed for Low Blood Sugar (Blood Sugar Less Than 70, Patient Has IV Access - Unresponsive, NPO or Unable To Safely Swallow). - Intravenous    dextrose (GLUTOSE) oral gel 15 g 15 g Every 15 Minutes PRN 10/28/2017     Sig - Route: Take 15 g by mouth Every 15 (Fifteen) Minutes As Needed for Low Blood Sugar (Blood Sugar Less Than 70, Patient Alert, Is Not NPO & Can Safely Swallow). - Oral    donepezil (ARICEPT) tablet 10 mg 10 mg Daily 10/28/2017     Sig - Route: Take 2 tablets by mouth Daily. - Oral    fluticasone (FLONASE) 50 MCG/ACT nasal spray 2 spray 2 spray Daily 10/30/2017     Sig - Route: 2 sprays by Each Nare route Daily. - Each Nare    glucagon (GLUCAGEN) injection 1 mg 1 mg Every 15 Minutes PRN 10/28/2017     Sig - Route: Inject 1 mg under the skin Every 15 (Fifteen) Minutes As Needed (Blood Glucose Less Than 70 - Patient Without IV Access - Unresponsive, NPO or Unable To Safely Swallow). - Subcutaneous    heparin (porcine) 5000 UNIT/ML injection 5,000 Units 5,000 Units Every 12 Hours Scheduled 10/28/2017     Sig - Route: Inject 1 mL under the skin Every 12 (Twelve) Hours. - Subcutaneous    hydrALAZINE (APRESOLINE) injection 20 mg 20 mg Every 6 Hours PRN 10/28/2017     Sig - Route: Infuse 1 mL into a venous catheter Every 6 (Six) Hours As Needed for High Blood Pressure. - Intravenous    insulin aspart (novoLOG) injection 0-7 Units 0-7 Units 4 Times Daily Before Meals & Nightly 10/28/2017     Sig - Route: Inject 0-7 Units under the skin 4 (Four) Times a Day Before Meals & at Bedtime. - Subcutaneous    insulin  detemir (LEVEMIR) injection 15 Units 15 Units Every Morning 11/1/2017     Sig - Route: Inject 15 Units under the skin Every Morning. - Subcutaneous    ipratropium-albuterol (DUO-NEB) nebulizer solution 3 mL 3 mL Every 6 Hours PRN 10/28/2017     Sig - Route: Take 3 mL by nebulization Every 6 (Six) Hours As Needed for Shortness of Air. - Nebulization    levothyroxine (SYNTHROID, LEVOTHROID) tablet 25 mcg 25 mcg Daily 10/28/2017     Sig - Route: Take 1 tablet by mouth Daily. - Oral    sodium chloride 0.9 % flush 1-10 mL 1-10 mL As Needed 10/28/2017     Sig - Route: Infuse 1-10 mL into a venous catheter As Needed for Line Care. - Intravenous    vitamin B-12 (CYANOCOBALAMIN) tablet 1,000 mcg 1,000 mcg Daily 10/29/2017     Sig - Route: Take 1 tablet by mouth Daily. - Oral    furosemide (LASIX) tablet 40 mg (Discontinued) 40 mg Daily 10/31/2017 10/31/2017    Sig - Route: Take 1 tablet by mouth Daily. - Oral    insulin detemir (LEVEMIR) injection 10 Units (Discontinued) 10 Units Every Morning 10/30/2017 10/31/2017    Sig - Route: Inject 10 Units under the skin Every Morning. - Subcutaneous    potassium chloride (K-DUR,KLOR-CON) CR tablet 20 mEq (Discontinued) 20 mEq Daily 10/28/2017 10/31/2017    Sig - Route: Take 1 tablet by mouth Daily. - Oral            Lab Results (last 24 hours)     Procedure Component Value Units Date/Time    POC Glucose Fingerstick [[id number]]  (Abnormal) Collected:  10/31/17 1653    Specimen:  Blood Updated:  10/31/17 1713     Glucose 237 (H) mg/dL     Narrative:       Meter: [id number] : [id number] magalie patel    POC Glucose Fingerstick [[id number]]  (Abnormal) Collected:  10/31/17 2000    Specimen:  Blood Updated:  10/31/17 2006     Glucose 230 (H) mg/dL     Narrative:       Meter: [id number] : [id number] gentry castellanos    CBC & Differential [[id number]] Collected:  11/01/17 0054    Specimen:  Blood Updated:  11/01/17 0139    Narrative:       The following orders were created for panel  order CBC & Differential.  Procedure                               Abnormality         Status                     ---------                               -----------         ------                     CBC Auto Differential[[id number]]        Abnormal            Final result                 Please view results for these tests on the individual orders.    CBC Auto Differential [[id number]]  (Abnormal) Collected:  11/01/17 0054    Specimen:  Blood Updated:  11/01/17 0139     WBC 10.97 10*3/mm3      RBC 4.30 10*6/mm3      Hemoglobin 11.5 (L) g/dL      Hematocrit 36.9 (L) %      MCV 85.8 fL      MCH 26.7 (L) pg      MCHC 31.2 (L) g/dL      RDW 13.9 %      RDW-SD 42.7 fl      MPV 9.6 fL      Platelets 361 10*3/mm3      Neutrophil % 71.8 %      Lymphocyte % 16.7 %      Monocyte % 8.2 %      Eosinophil % 2.6 %      Basophil % 0.5 %      Immature Grans % 0.2 %      Neutrophils, Absolute 7.89 (H) 10*3/mm3      Lymphocytes, Absolute 1.83 10*3/mm3      Monocytes, Absolute 0.90 10*3/mm3      Eosinophils, Absolute 0.28 10*3/mm3      Basophils, Absolute 0.05 10*3/mm3      Immature Grans, Absolute 0.02 10*3/mm3     C-reactive Protein [[id number]]  (Normal) Collected:  11/01/17 0054    Specimen:  Blood Updated:  11/01/17 0208     C-Reactive Protein <0.50 mg/dL     Comprehensive Metabolic Panel [[id number]]  (Abnormal) Collected:  11/01/17 0054    Specimen:  Blood Updated:  11/01/17 0209     Glucose 215 (H) mg/dL      BUN 18 mg/dL      Creatinine 1.30 (H) mg/dL      Sodium 138 mmol/L      Potassium 3.9 mmol/L       1+ Hemolysis         Chloride 102 mmol/L      CO2 28.7 mmol/L      Calcium 9.0 mg/dL      Total Protein 6.9 g/dL      Albumin 3.70 g/dL      ALT (SGPT) 19 U/L      AST (SGOT) 24 U/L      Alkaline Phosphatase 93 U/L       Note New Reference Ranges        Total Bilirubin 0.4 mg/dL      eGFR Non African Amer 40 (L) mL/min/1.73      Globulin 3.2 gm/dL      A/G Ratio 1.2 (L) g/dL      BUN/Creatinine Ratio 13.8     Anion Gap 7.3  mmol/L     Narrative:       The MDRD GFR formula is only valid for adults with stable renal function between ages 18 and 70.    Osmolality, Calculated [[id number]]  (Normal) Collected:  11/01/17 0054    Specimen:  Blood Updated:  11/01/17 0209     Osmolality Calc 284.1 mOsm/kg     POC Glucose Fingerstick [[id number]]  (Abnormal) Collected:  11/01/17 0653    Specimen:  Blood Updated:  11/01/17 0705     Glucose 154 (H) mg/dL     Narrative:       Meter: [id number] : [id number] Clark ROSA    POC Glucose Fingerstick [[id number]]  (Abnormal) Collected:  11/01/17 1048    Specimen:  Blood Updated:  11/01/17 1055     Glucose 278 (H) mg/dL     Narrative:       Meter: [id number] : [id number] arturo shruti        Imaging Results (last 24 hours)     Procedure Component Value Units Date/Time    FL Video Swallow [[id number]] Collected:  10/31/17 1447     Updated:  10/31/17 1531    Narrative:       FLUOROSCOPIC VIDEO SWALLOW-     CLINICAL INDICATION: Silent aspiration; F03.91-Unspecified dementia with  behavioral disturbance; R06.02-Shortness of breath.          TECHNIQUE:   Speech Therapy Service was present. The patient was examined in the  sitting lateral position and was given several consistencies of barium.     FINDINGS: Fleeting penetration but no aspiration.     FLUOROSCOPY TIME: 0.9 minutes.     IMAGES: Cine loop was acquired.       Impression:       No evidence of aspiration.     For additional information please see the report provided by the Speech  Therapy Service.     This report was finalized on 10/31/2017 3:29 PM by Dr. Gopal Clark MD.           Orders (last 24 hrs)     Start     Ordered    11/02/17 0000  vitamin B-12 (VITAMIN B-12) 1000 MCG tablet  Daily      11/01/17 1401    11/01/17 1357  Discharge patient  Once      11/01/17 1401    11/01/17 1357  Discontinue IV  Once      11/01/17 1401    11/01/17 1056  POC Glucose Fingerstick  Once      11/01/17 1055    11/01/17 0706  POC Glucose Fingerstick  Once       11/01/17 0705    11/01/17 0700  insulin detemir (LEVEMIR) injection 15 Units  Every Morning      10/31/17 1615    11/01/17 0600  Comprehensive Metabolic Panel  Morning Draw      10/31/17 1615    11/01/17 0600  CBC & Differential  Morning Draw      10/31/17 1615    11/01/17 0600  C-reactive Protein  Morning Draw      10/31/17 1615    11/01/17 0600  CBC Auto Differential  PROCEDURE ONCE      11/01/17 0000    11/01/17 0206  Osmolality, Calculated  Once      11/01/17 0205    11/01/17 0000  carvedilol (COREG) 12.5 MG tablet  Every 12 Hours Scheduled      11/01/17 1401    11/01/17 0000  SITagliptin (JANUVIA) 50 MG tablet  Daily      11/01/17 1401    11/01/17 0000  Referral to Home Health      11/01/17 1401    11/01/17 0000  Discharge Follow-up with PCP      11/01/17 1401    11/01/17 0000  Diet: Regular, Consistent Carbohydrate      11/01/17 1401    11/01/17 0000  Home CPAP/BiPAP Settings      11/01/17 1401    10/31/17 2007  POC Glucose Fingerstick  Once      10/31/17 2006    10/31/17 1714  POC Glucose Fingerstick  Once      10/31/17 1713    10/31/17 0900  furosemide (LASIX) tablet 40 mg  Daily,   Status:  Discontinued      10/30/17 1623    10/30/17 1800  fluticasone (FLONASE) 50 MCG/ACT nasal spray 2 spray  Daily      10/30/17 1609    10/30/17 1800  insulin detemir (LEVEMIR) injection 10 Units  Every Morning,   Status:  Discontinued      10/30/17 1611    10/29/17 0900  vitamin B-12 (CYANOCOBALAMIN) tablet 1,000 mcg  Daily      10/28/17 2056    10/29/17 0900  carvedilol (COREG) tablet 12.5 mg  Every 12 Hours Scheduled      10/28/17 2119    10/28/17 2100  atorvastatin (LIPITOR) tablet 20 mg  Daily      10/28/17 1903    10/28/17 2100  citalopram (CeleXA) tablet 10 mg  Daily      10/28/17 1903    10/28/17 2100  donepezil (ARICEPT) tablet 10 mg  Daily      10/28/17 1903    10/28/17 2100  levothyroxine (SYNTHROID, LEVOTHROID) tablet 25 mcg  Daily      10/28/17 1903    10/28/17 0900  heparin (porcine) 5000 UNIT/ML injection  5,000 Units  Every 12 Hours Scheduled      10/28/17 0320    10/28/17 0900  potassium chloride (K-DUR,KLOR-CON) CR tablet 20 mEq  Daily,   Status:  Discontinued      10/28/17 0624    10/28/17 0730  insulin aspart (novoLOG) injection 0-7 Units  4 Times Daily Before Meals & Nightly      10/28/17 0627    10/28/17 0700  POC Glucose Fingerstick  4 Times Daily Before Meals & at Bedtime      10/28/17 0627    10/28/17 0629  glucagon (GLUCAGEN) injection 1 mg  Every 15 Minutes PRN      10/28/17 0629    10/28/17 0627  ipratropium-albuterol (DUO-NEB) nebulizer solution 3 mL  Every 6 Hours PRN      10/28/17 0628    10/28/17 0627  dextrose (GLUTOSE) oral gel 15 g  Every 15 Minutes PRN      10/28/17 0627    10/28/17 0627  dextrose (D50W) solution 25 g  Every 15 Minutes PRN      10/28/17 0627    10/28/17 0626  hydrALAZINE (APRESOLINE) injection 20 mg  Every 6 Hours PRN      10/28/17 0626    10/28/17 0320  sodium chloride 0.9 % flush 1-10 mL  As Needed      10/28/17 0320    Unscheduled  Patient May Use Home CPAP / BIPAP For Sleep or As Needed  As Needed      10/28/17 0628    --  ipratropium-albuterol (DUO-NEB) 0.5-2.5 mg/mL nebulizer  3 Times Daily PRN      10/28/17 0303    --  atorvastatin (LIPITOR) 20 MG tablet  Daily      10/28/17 0303    --  donepezil (ARICEPT) 10 MG tablet  Daily      10/28/17 0303    --  furosemide (LASIX) 20 MG tablet  Daily      10/28/17 0303    --  potassium chloride (K-DUR) 10 MEQ CR tablet  Daily      10/28/17 0303    --  levothyroxine (SYNTHROID, LEVOTHROID) 25 MCG tablet  Daily      10/28/17 0304    --  citalopram (CeleXA) 10 MG tablet  Daily      10/28/17 0304    --  metFORMIN ER (GLUCOPHAGE-XR) 500 MG 24 hr tablet  2 Times Daily      10/28/17 0306          Operative/Procedure Notes (most recent note)     No notes of this type exist for this encounter.           Physician Progress Notes (most recent note)      Deniz Gonsalves MD at 10/31/2017  4:09 PM  Version 2 of 2           I have seen the patient in  conjunction with [place] [facility]       History of presenting illness:  Patient states she is actually feeling better today, breathing is doing better.  She thinks her edema is better.  She ambulated with therapy 200 feet and apparently was minimal assist.  Patient has tolerated her diet, no chest pain and no abdominal pain.    Vital Signs  Temp:  [97.4 °F (36.3 °C)-98.9 °F (37.2 °C)] 97.8 °F (36.6 °C)  Heart Rate:  [76-86] 76  Resp:  [18-20] 20  BP: (132-158)/(72-88) 142/79  Body mass index is 35.26 kg/(m^2).      Intake/Output Summary (Last 24 hours) at 10/31/17 1610  Last data filed at 10/31/17 1230   Gross per 24 hour   Intake             1560 ml   Output             2700 ml   Net            -1140 ml     Intake & Output (last 3 days)       10/28 0701 - 10/29 0700 10/29 0701 - 10/30 0700 10/30 0701 - 10/31 0700 10/31 0701 - 11/01 0700    P.O. 1080 1320 1440 840    Total Intake(mL/kg) 1080 (11.1) 1320 (13.6) 1440 (15.9) 840 (9.3)    Urine (mL/kg/hr) 1000 (0.4) 2000 (0.9) 2700 (1.2)     Stool 0 (0) 0 (0) 0 (0)     Total Output 1000 2000 2700      Net +80 -680 -1260 +840            Unmeasured Urine Occurrence 3 x 3 x 2 x     Unmeasured Stool Occurrence 5 x 4 x 3 x           Physical exam:  Physical Exam   Constitutional: Well-developed, well-nourished/ obese.  Pleasant.  No distress, she is currently on room air with a saturation 94%  Head: Normocephalic and atraumatic.  Hearing is intact, mucous membranes are moist.   Eyes:  Pupils are equal, round No scleral icterus.    Cardiovascular: Normal rate, regular rhythm and normal heart sounds.  No murmur rub or gallop  Pulmonary/Chest: Bilateral breath sounds no rhonchus rales or wheezing heard diminished At the right base otherwise clear  Abdominal: Soft, flat, bowel sounds are active, nondistended nontender.  No peritoneal signs   Musculoskeletal: Strength is symmetric  Trace edema  Neurologic: Nonfocal, alert.   Skin: Skin is warm and dry.   Psychiatric:  Normal mood and  affect.     Telemetry: Sinus rhythm, reviewed  EKG from today reviewed, essentially normal      Results Review:  Lab Results   Component Value Date    WBC 9.78 10/31/2017    HGB 10.8 (L) 10/31/2017    HCT 35.0 (L) 10/31/2017    MCV 85.8 10/31/2017     10/31/2017     Lab Results   Component Value Date    GLUCOSE 141 (H) 10/31/2017    BUN 18 10/31/2017    CREATININE 1.39 (H) 10/31/2017    EGFRIFNONA 37 (L) 10/31/2017    BCR 12.9 10/31/2017    CO2 30.3 10/31/2017    CALCIUM 9.1 10/31/2017    ALBUMIN 3.50 10/28/2017    LABIL2 1.0 (L) 10/28/2017    AST 17 10/28/2017    ALT 13 10/28/2017       Imaging Results (last 72 hours)     Procedure Component Value Units Date/Time    XR Chest 1 View [[id number]] Collected:  10/28/17 1008     Updated:  10/28/17 1011    Narrative:       Technique: Frontal view the chest.     COMPARISON:  None     INDICATION:     soa      FINDINGS:    2.1 cm right lower hilar region possible lymphadenopathy or  coronary vasculature engorgement secondary congestive failure.  Short-term follow-up upon resolution of symptoms is recommended.  Cardiomegaly is noted. Prominent interstitial markings are noted. There  are small bilateral pleural effusions.        Impression:       2.1 cm right lower hilar region possible lymphadenopathy  or coronary vasculature engorgement secondary congestive failure.  Short-term follow-up upon resolution of symptoms is recommended.     This report was finalized on 10/28/2017 10:09 AM by Dr. Rigo Dong MD.            CT images reviewed    Echo with normal ejection fraction.       Assessment/Plan     Active Problems:  Dyspnea, She does appear to have a small pleural effusion, this is most likely resolving right pneumonia with admitted residual parapneumonic effusion.  She also had one enlarged lymph node.  I suggested that CT scan be repeated in 4-6 weeks.  Because of elevated creatinine today and holding further Lasix therapy.  BNP was only minimally  elevated.    DVT prophylaxis, subcutaneous heparin     Diabetes, still not well controlled, Levemir adjusted    Rhinorrhea, Flonase     EKG normal, repeat troponin negative     is working on disposition to short-term nursing home.  I'm uncertain at her current functional level if this will be approved, if not patient would be willing to try home.    Probable CKD 3, slight rise in creatinine, Lasix, potassium held, I'm holding on hydrating, recheck in a.m.    Hypothyroidism, adequately replaced    Patient is on Aricept however she seems to carry on a conversation well and memory seems good, will follow.      Deniz Gonsalves MD  10/31/17  4:10 PM       Electronically signed by Deniz Gonsalves MD at 11/1/2017  1:40 PM      Deniz Gonsalves MD at 10/31/2017  4:09 PM  Version 1 of 2           I have seen the patient in conjunction with [place] [facility]       History of presenting illness:  Patient states she is actually feeling better today, breathing is doing better.  She thinks her edema is better.  She ambulated with therapy 200 feet and apparently was minimal assist.  Patient has tolerated her diet, no chest pain and no abdominal pain.    Vital Signs  Temp:  [97.4 °F (36.3 °C)-98.9 °F (37.2 °C)] 97.8 °F (36.6 °C)  Heart Rate:  [76-86] 76  Resp:  [18-20] 20  BP: (132-158)/(72-88) 142/79  Body mass index is 35.26 kg/(m^2).      Intake/Output Summary (Last 24 hours) at 10/31/17 1610  Last data filed at 10/31/17 1230   Gross per 24 hour   Intake             1560 ml   Output             2700 ml   Net            -1140 ml     Intake & Output (last 3 days)       10/28 0701 - 10/29 0700 10/29 0701 - 10/30 0700 10/30 0701 - 10/31 0700 10/31 0701 - 11/01 0700    P.O. 1080 1320 1440 840    Total Intake(mL/kg) 1080 (11.1) 1320 (13.6) 1440 (15.9) 840 (9.3)    Urine (mL/kg/hr) 1000 (0.4) 2000 (0.9) 2700 (1.2)     Stool 0 (0) 0 (0) 0 (0)     Total Output 1000 2000 2700      Net +80 -680 -1260 +840            Unmeasured Urine  Occurrence 3 x 3 x 2 x     Unmeasured Stool Occurrence 5 x 4 x 3 x           Physical exam:  Physical Exam   Constitutional: Well-developed, well-nourished/ obese.  Pleasant.  No distress, she is currently on room air with a saturation 94%  Head: Normocephalic and atraumatic.  Hearing is intact, mucous membranes are moist.   Eyes:  Pupils are equal, round No scleral icterus.    Cardiovascular: Normal rate, regular rhythm and normal heart sounds.  No murmur rub or gallop  Pulmonary/Chest: Bilateral breath sounds no rhonchus rales or wheezing heard diminished At the right base otherwise clear  Abdominal: Soft, flat, bowel sounds are active, nondistended nontender.  No peritoneal signs   Musculoskeletal: Strength is symmetric  Trace edema  Neurologic: Nonfocal, alert.   Skin: Skin is warm and dry.   Psychiatric:  Normal mood and affect.     Telemetry: Sinus rhythm, reviewed  EKG from today reviewed, essentially normal      Results Review:  Lab Results   Component Value Date    WBC 9.78 10/31/2017    HGB 10.8 (L) 10/31/2017    HCT 35.0 (L) 10/31/2017    MCV 85.8 10/31/2017     10/31/2017     Lab Results   Component Value Date    GLUCOSE 141 (H) 10/31/2017    BUN 18 10/31/2017    CREATININE 1.39 (H) 10/31/2017    EGFRIFNONA 37 (L) 10/31/2017    BCR 12.9 10/31/2017    CO2 30.3 10/31/2017    CALCIUM 9.1 10/31/2017    ALBUMIN 3.50 10/28/2017    LABIL2 1.0 (L) 10/28/2017    AST 17 10/28/2017    ALT 13 10/28/2017       Imaging Results (last 72 hours)     Procedure Component Value Units Date/Time    XR Chest 1 View [[id number]] Collected:  10/28/17 1008     Updated:  10/28/17 1011    Narrative:       Technique: Frontal view the chest.     COMPARISON:  None     INDICATION:     soa      FINDINGS:    2.1 cm right lower hilar region possible lymphadenopathy or  coronary vasculature engorgement secondary congestive failure.  Short-term follow-up upon resolution of symptoms is recommended.  Cardiomegaly is noted. Prominent  interstitial markings are noted. There  are small bilateral pleural effusions.        Impression:       2.1 cm right lower hilar region possible lymphadenopathy  or coronary vasculature engorgement secondary congestive failure.  Short-term follow-up upon resolution of symptoms is recommended.     This report was finalized on 10/28/2017 10:09 AM by Dr. Rigo Dong MD.            CT images reviewed    Echo with normal ejection fraction.       Assessment/Plan     Active Problems:  Dyspnea, She does appear to have a small pleural effusion, this is most likely resolving right pneumonia with admitted residual parapneumonic effusion.  She also had one enlarged lymph node.  I suggested that CT scan be repeated in 4-6 weeks.  Because of elevated creatinine today and holding further Lasix therapy.  BNP was only minimally elevated.    Possible EKG of normality, recheck troponin in a.m. and recheck EKG. This appeared nonspecific to my viewing     DVT prophylaxis, subcutaneous heparin     Diabetes, still not well controlled, Levemir adjusted    Rhinorrhea, Flonase     EKG normal, repeat troponin negative     is working on disposition to short-term nursing home.  I'm uncertain at her current functional level if this will be approved, if not patient would be willing to try home.    Probable CKD 3, slight rise in creatinine, Lasix, potassium held, I'm holding on hydrating, recheck in a.m.    Hypothyroidism, adequately replaced    Patient is on Aricept however she seems to carry on a conversation well and memory seems good, will follow.      Deniz Gonsalves MD  10/31/17  4:10 PM       Electronically signed by Deniz Gonsalves MD at 10/31/2017  4:14 PM        Consult Notes (most recent note)     No notes of this type exist for this encounter.        Nutrition Notes (most recent note)     No notes of this type exist for this encounter.           Physical Therapy Notes (most recent note)      Lupe Hays, PT at  2017 10:14 AM  Version 1 of 1         Acute Care - Physical Therapy Treatment Note  [facility]     Patient Name: Lydia Brumfield  : 1945  MRN: [id number]  Today's Date: 2017  Onset of Illness/Injury or Date of Surgery Date: 10/27/17 (admit date)  Date of Referral to PT: 10/29/17  Referring Physician: Arabella    Admit Date: 10/27/2017    Visit Dx:    ICD-10-CM ICD-9-CM   1. Dementia with behavioral disturbance, unspecified dementia type F03.91 294.21   2. Shortness of breath R06.02 786.05     Patient Active Problem List   Diagnosis   • Dyspnea               Adult Rehabilitation Note       17 1008 10/31/17 1258 10/31/17 1117    Rehab Assessment/Intervention    Discipline physical therapist  -CT occupational therapist  -KR physical therapist  -CT    Document Type therapy note (daily note)  -CT therapy note (daily note)  -KR therapy note (daily note)  -CT    Subjective Information agree to therapy;complains of;weakness  -CT agree to therapy;complains of;weakness  -KR agree to therapy  -CT    Patient Effort, Rehab Treatment good  -CT good  -KR good  -CT    Symptoms Noted During/After Treatment   fatigue  -CT    Precautions/Limitations fall precautions  -CT  fall precautions  -CT    Patient Response to Treatment Pt tolerated treatment session well and continues to progress with therapy. Pt is SBA with gait at this time.   -CT  Pt tolerated treatment session well with rest breaks provided as needed. Pt reports decreased swelling in BLE today.   -CT    Recorded by [CT] Lupe Hays, PT [KR] Panda Diaz OT [CT] Lupe Hays PT    Pain Assessment    Pain Assessment No/denies pain  -CT  No/denies pain  -CT    Recorded by [CT] Lupe Hays, PT  [CT] Lupe Hays, PT    Cognitive Assessment/Intervention    Current Cognitive/Communication Assessment functional  -CT  functional  -CT    Orientation Status oriented x 4  -CT  oriented x 4  -CT    Follows Commands/Answers Questions able to follow  multi-step instructions;100% of the time  -CT  able to follow single-step instructions;100% of the time  -CT    Personal Safety decreased awareness, need for assist;decreased awareness, need for safety  -CT  decreased awareness, need for assist;decreased awareness, need for safety  -CT    Personal Safety Interventions fall prevention program maintained;gait belt;muscle strengthening facilitated;nonskid shoes/slippers when out of bed  -CT  fall prevention program maintained;gait belt;muscle strengthening facilitated;nonskid shoes/slippers when out of bed  -CT    Recorded by [CT] Lupe Hays, PT  [CT] Lupe Hays PT    Bed Mobility, Assessment/Treatment    Bed Mobility, Comment deferred pt up in bedside chair  -CT  deferred pt up in chair  -CT    Recorded by [CT] Lupe Hays, PT  [CT] Lupe Hays PT    Transfer Assessment/Treatment    Transfers, Sit-Stand [place] contact guard assist  -CT  minimum assist (75% patient effort)  -CT    Transfers, Stand-Sit [place] contact guard assist  -CT  minimum assist (75% patient effort)  -CT    Transfers, Sit-Stand-Sit, Assist Device rolling walker  -CT  rolling walker  -CT    Transfer, Impairments strength decreased  -CT  strength decreased;impaired balance  -CT    Recorded by [CT] Lupe Hays, PT  [CT] Lupe Hays PT    Gait Assessment/Treatment    Gait, [place] Level stand by assist;contact guard assist;verbal cues required  -CT  contact guard assist;verbal cues required;nonverbal cues required (demo/gesture)  -CT    Gait, Assistive Device rolling walker  -CT  rolling walker  -CT    Gait, Distance (Feet) 300  -CT  300  -CT    Gait, Gait Pattern Analysis swing-to gait  -CT  swing-to gait  -CT    Gait, Impairments strength decreased;impaired balance  -CT  strength decreased;impaired balance  -CT    Recorded by [CT] Lupe Hays, PT  [CT] Lupe Hays, PT    ADL Assessment/Intervention    Additional Documentation  Self-Feeding  Assessment/Training (Group)  -KR     Recorded by  [KR] Panda Diaz OT     Self-Feeding Assessment/Training    Self-Feeding Assess/Train, [place]  set up required  -KR     Recorded by  [KR] Panda Diaz OT     Therapy Exercises    Bilateral Lower Extremities AROM:;10 reps;sitting  -CT  AROM:;10 reps;sitting  -CT    Recorded by [CT] Lupe Hays, PT  [CT] Lupe Hays, PT    Positioning and Restraints    Pre-Treatment Position sitting in chair/recliner  -CT  sitting in chair/recliner  -CT    Post Treatment Position chair  -CT  chair  -CT    In Chair sitting;call light within reach;encouraged to call for assist;notified nsg  -CT  sitting;call light within reach;encouraged to call for assist;notified nsg  -CT    Recorded by [CT] Lupe Hays, PT  [CT] Lupe Hays PT      User Key  (r) = Recorded By, (t) = Taken By, (c) = Cosigned By    Initials Name Effective Dates    ALLEN Diaz OT 03/14/16 -     CT Lupe Hays PT 03/14/16 -                 IP PT Goals       10/30/17 1631          Transfer Training PT LTG    Transfer Training PT LTG, Date Established 10/30/17  -CT      Transfer Training PT LTG, Time to Achieve by discharge  -CT      Transfer Training PT LTG, Activity Type bed to chair /chair to bed;sit to stand/stand to sit  -CT      Transfer Training PT LTG, [place] Level supervision required;contact guard assist  -CT      Transfer Training PT LTG, Assist Device other (see comments)   with appropriate AD  -CT      Gait Training PT LTG    Gait Training Goal PT LTG, Date Established 10/30/17  -CT      Gait Training Goal PT LTG, Time to Achieve by discharge  -CT      Gait Training Goal PT LTG, [place] Level supervision required;contact guard assist  -CT      Gait Training Goal PT LTG, Assist Device other (see comments)   with appropriate AD  -CT      Gait Training Goal PT LTG, Distance to Achieve 250  -CT        User Key  (r) = Recorded By, (t) = Taken By, (c) = Cosigned By     Initials Name Provider Type    CT Lupe Hays PT Physical Therapist          Physical Therapy Education     Title: PT OT SLP Therapies (Active)     Topic: Physical Therapy (Done)     Point: Mobility training (Done)    Learning Progress Summary    Learner Readiness Method Response Comment Documented by Status   Patient Acceptance E VU  CT 11/01/17 1012 Done    Eager E NR  KR 10/31/17 1259 Active    Acceptance E VU  CT 10/31/17 1121 Done    Acceptance E VU  CT 10/30/17 1632 Done               Point: Home exercise program (Done)    Learning Progress Summary    Learner Readiness Method Response Comment Documented by Status   Patient Acceptance E VU  CT 11/01/17 1012 Done    Eager E NR  KR 10/31/17 1259 Active    Acceptance E VU  CT 10/31/17 1121 Done    Acceptance E VU  CT 10/30/17 1632 Done               Point: Body mechanics (Done)    Learning Progress Summary    Learner Readiness Method Response Comment Documented by Status   Patient Acceptance E VU  CT 11/01/17 1012 Done    Eager E NR  KR 10/31/17 1259 Active    Acceptance E VU  CT 10/31/17 1121 Done    Acceptance E VU  CT 10/30/17 1632 Done               Point: Precautions (Done)    Learning Progress Summary    Learner Readiness Method Response Comment Documented by Status   Patient Acceptance E VU  CT 11/01/17 1012 Done    Eager E NR  KR 10/31/17 1259 Active    Acceptance E VU  CT 10/31/17 1121 Done    Acceptance E VU  CT 10/30/17 1632 Done                      User Key     Initials Effective Dates Name Provider Type Discipline    KR 03/14/16 -  Panda Diaz, OT Occupational Therapist OT    CT 03/14/16 -  Lupe Hays PT Physical Therapist PT                    PT Recommendation and Plan  Anticipated Equipment Needs At Discharge: front wheeled walker  Anticipated Discharge Disposition: home with assist, home with home health  Planned Therapy Interventions: balance training, bed mobility training, gait training, home exercise program, neuromuscular  re-education, patient/family education, postural re-education, strengthening, transfer training  PT Frequency: 3-5 times/wk, per priority policy             Outcome Measures       11/01/17 1000 10/31/17 1200 10/30/17 1600    How much help from another person do you currently need...    Turning from your back to your side while in flat bed without using bedrails? 4  -CT  4  -CT    Moving from lying on back to sitting on the side of a flat bed without bedrails? 3  -CT  3  -CT    Moving to and from a bed to a chair (including a wheelchair)? 3  -CT  3  -CT    Standing up from a chair using your arms (e.g., wheelchair, bedside chair)? 4  -CT  3  -CT    Climbing 3-5 steps with a railing? 3  -CT  2  -CT    To walk in hospital room? 4  -CT  3  -CT    AM-PAC 6 Clicks Score 21  -CT  18  -CT    Functional Assessment    Outcome Measure Options AM-PAC 6 Clicks Basic Mobility (PT)  -CT AM-PAC 6 Clicks Daily Activity (OT)  -KR AM-PAC 6 Clicks Basic Mobility (PT)  -CT      10/30/17 1253          How much help from another is currently needed...    Putting on and taking off regular lower body clothing? 2  -KR      Bathing (including washing, rinsing, and drying) 3  -KR      Toileting (which includes using toilet bed pan or urinal) 2  -KR      Putting on and taking off regular upper body clothing 3  -KR      Taking care of personal grooming (such as brushing teeth) 3  -KR      Eating meals 4  -KR      Score 17  -KR        User Key  (r) = Recorded By, (t) = Taken By, (c) = Cosigned By    Initials Name Provider Type    ALLEN Diaz OT Occupational Therapist    CT Lupe Hays, PAULA Physical Therapist           Time Calculation:         PT Charges       11/01/17 1013          Time Calculation    PT Received On 11/01/17  -CT      PT - Next Appointment 11/02/17  -CT      PT Goal Re-Cert Due Date 11/13/17  -CT      Time Calculation- PT    Total Timed Code Minutes- PT 26 minute(s)  -CT        User Key  (r) = Recorded By, (t) = Taken By,  (c) = Cosigned By    Initials Name Provider Type    CT Lupe Hays PT Physical Therapist          Therapy Charges for Today     Code Description Service Date Service Provider Modifiers Qty    65303543661 HC GAIT TRAINING EA 15 MIN 10/31/2017 Lupe Hays, PT GP 1    92124349716 HC PT THER PROC EA 15 MIN 10/31/2017 Lupe Hays, PT GP 1    52622126909 HC PT THER SUPP EA 15 MIN 10/31/2017 Lupe Hays, PT GP 2    32172930376 HC GAIT TRAINING EA 15 MIN 2017 Lupe Hays, PT GP 1    64933218366 HC PT THER PROC EA 15 MIN 2017 Lupe Hays, PT GP 1    78630480294 HC PT THER SUPP EA 15 MIN 2017 Lupe Hays, PT GP 2          PT G-Codes  Outcome Measure Options: AM-PAC 6 Clicks Basic Mobility (PT)  Score: 18  Functional Limitation: Mobility: Walking and moving around  Mobility: Walking and Moving Around Current Status (): At least 40 percent but less than 60 percent impaired, limited or restricted  Mobility: Walking and Moving Around Goal Status (): At least 20 percent but less than 40 percent impaired, limited or restricted    Lupe Hays PT  2017            Electronically signed by Lupe Hays PT at 2017 10:14 AM           Occupational Therapy Notes (most recent note)      Panda Diaz, OT at 10/31/2017  1:02 PM  Version 1 of 1         Acute Care - Occupational Therapy Treatment Note  [facility]     Patient Name: Lydia Brumfield  : 1945  MRN: [id number]  Today's Date: 10/31/2017  Onset of Illness/Injury or Date of Surgery Date: 10/27/17 (admit date)     Referring Physician: Arabella      Admit Date: 10/27/2017    Visit Dx:     ICD-10-CM ICD-9-CM   1. Dementia with behavioral disturbance, unspecified dementia type F03.91 294.21   2. Shortness of breath R06.02 786.05     Patient Active Problem List   Diagnosis   • Dyspnea             Adult Rehabilitation Note       10/31/17 1258 10/31/17 1117       Rehab Assessment/Intervention    Discipline occupational therapist   -KR physical therapist  -CT     Document Type therapy note (daily note)  -KR therapy note (daily note)  -CT     Subjective Information agree to therapy;complains of;weakness  -KR agree to therapy  -CT     Patient Effort, Rehab Treatment good  -KR good  -CT     Symptoms Noted During/After Treatment  fatigue  -CT     Precautions/Limitations  fall precautions  -CT     Patient Response to Treatment  Pt tolerated treatment session well with rest breaks provided as needed. Pt reports decreased swelling in BLE today.   -CT     Recorded by [KR] Panda Diaz OT [CT] Lupe Hays, PT     Pain Assessment    Pain Assessment  No/denies pain  -CT     Recorded by  [CT] Lupe Hays PT     Cognitive Assessment/Intervention    Current Cognitive/Communication Assessment  functional  -CT     Orientation Status  oriented x 4  -CT     Follows Commands/Answers Questions  able to follow single-step instructions;100% of the time  -CT     Personal Safety  decreased awareness, need for assist;decreased awareness, need for safety  -CT     Personal Safety Interventions  fall prevention program maintained;gait belt;muscle strengthening facilitated;nonskid shoes/slippers when out of bed  -CT     Recorded by  [CT] Lupe Hays PT     Bed Mobility, Assessment/Treatment    Bed Mobility, Comment  deferred pt up in chair  -CT     Recorded by  [CT] Lupe Hays PT     Transfer Assessment/Treatment    Transfers, Sit-Stand [place]  minimum assist (75% patient effort)  -CT     Transfers, Stand-Sit [place]  minimum assist (75% patient effort)  -CT     Transfers, Sit-Stand-Sit, Assist Device  rolling walker  -CT     Transfer, Impairments  strength decreased;impaired balance  -CT     Recorded by  [CT] Lupe Hays PT     Gait Assessment/Treatment    Gait, [place] Level  contact guard assist;verbal cues required;nonverbal cues required (demo/gesture)  -CT     Gait, Assistive Device  rolling walker  -CT     Gait, Distance (Feet)   300  -CT     Gait, Gait Pattern Analysis  swing-to gait  -CT     Gait, Impairments  strength decreased;impaired balance  -CT     Recorded by  [CT] Lupe Hays PT     ADL Assessment/Intervention    Additional Documentation Self-Feeding Assessment/Training (Group)  -KR      Recorded by [KR] Panda Diaz OT      Self-Feeding Assessment/Training    Self-Feeding Assess/Train, [place] set up required  -KR      Recorded by [KR] Panda Diaz OT      Therapy Exercises    Bilateral Lower Extremities  AROM:;10 reps;sitting  -CT     Recorded by  [CT] Lupe Hays PT     Positioning and Restraints    Pre-Treatment Position  sitting in chair/recliner  -CT     Post Treatment Position  chair  -CT     In Chair  sitting;call light within reach;encouraged to call for assist;notified nsg  -CT     Recorded by  [CT] Lupe Hays PT       User Key  (r) = Recorded By, (t) = Taken By, (c) = Cosigned By    Initials Name Effective Dates    ALLEN Diaz OT 03/14/16 -     CT Lupe Hays PT 03/14/16 -                 OT Goals       10/30/17 1252          Strength OT LTG    Strength Goal OT LTG, Date Established 10/30/17  -KR      Strength Goal OT LTG, Time to Achieve by discharge  -KR      Strength Goal OT LTG, Measure to Achieve BUE increase x 1 to enhance self care/mobility skills  -KR      ADL OT LTG    ADL OT LTG, Date Established 10/30/17  -KR      ADL OT LTG, Time to Achieve by discharge  -KR      ADL OT LTG, Activity Type ADL skills  -KR      ADL OT LTG, [place] Level standby assist  -KR        User Key  (r) = Recorded By, (t) = Taken By, (c) = Cosigned By    Initials Name Provider Type    ALLEN Diaz OT Occupational Therapist          Occupational Therapy Education     Title: PT OT SLP Therapies (Active)     Topic: Occupational Therapy (Active)     Point: ADL training (Active)    Description: Instruct learner(s) on proper safety adaptation and remediation techniques during self care or transfers.    Instruct in proper use of assistive devices.    Learning Progress Summary    Learner Readiness Method Response Comment Documented by Status   Patient Eager E NR  KR 10/31/17 1259 Active                      User Key     Initials Effective Dates Name Provider Type [facility] 03/14/16 -  Panda Diaz OT Occupational Therapist OT                  OT Recommendation and Plan  Planned Therapy Interventions: strengthening  Therapy Frequency: 3-5 times/wk           Outcome Measures       10/31/17 1200 10/30/17 1600 10/30/17 1253    How much help from another person do you currently need...    Turning from your back to your side while in flat bed without using bedrails?  4  -CT     Moving from lying on back to sitting on the side of a flat bed without bedrails?  3  -CT     Moving to and from a bed to a chair (including a wheelchair)?  3  -CT     Standing up from a chair using your arms (e.g., wheelchair, bedside chair)?  3  -CT     Climbing 3-5 steps with a railing?  2  -CT     To walk in hospital room?  3  -CT     AM-PAC 6 Clicks Score  18  -CT     How much help from another is currently needed...    Putting on and taking off regular lower body clothing?   2  -KR    Bathing (including washing, rinsing, and drying)   3  -KR    Toileting (which includes using toilet bed pan or urinal)   2  -KR    Putting on and taking off regular upper body clothing   3  -KR    Taking care of personal grooming (such as brushing teeth)   3  -KR    Eating meals   4  -KR    Score   17  -KR    Functional Assessment    Outcome Measure Options AM-PAC 6 Clicks Daily Activity (OT)  -KR AM-PAC 6 Clicks Basic Mobility (PT)  -CT       User Key  (r) = Recorded By, (t) = Taken By, (c) = Cosigned By    Initials Name Provider Type    ALLEN Diaz OT Occupational Therapist    CT Lupe Hays PT Physical Therapist           Time Calculation:         Time Calculation- OT       10/31/17 1301          Time Calculation- OT    Total Timed Code  Minutes- OT 15 minute(s)  -ALLEN        User Key  (r) = Recorded By, (t) = Taken By, (c) = Cosigned By    Initials Name Provider Type    KR Panda Diaz OT Occupational Therapist           Therapy Charges for Today     Code Description Service Date Service Provider Modifiers Qty    99722210050 HC OT SELFCARE CURRENT 10/30/2017 Panda Diaz OT GO, CK 1    72291263095 HC OT SELFCARE PROJECTED 10/30/2017 Panda Diaz OT GO, CI 1    [id number]  OT EVAL HIGH COMPLEXITY 2 10/30/2017 Panda Diaz OT GO 1    [id number]  OT SELF CARE/MGMT/TRAIN EA 15 MIN 10/31/2017 Panda Diaz OT GO 1          OT G-codes  Functional Limitation: Self care  Self Care Current Status (): At least 40 percent but less than 60 percent impaired, limited or restricted  Self Care Goal Status (): At least 1 percent but less than 20 percent impaired, limited or restricted    Panda Diaz OT  10/31/2017     Electronically signed by Panda Diaz OT at 10/31/2017  1:04 PM           Speech Language Pathology Notes (most recent note)      Telma Jefferson, MS CCC-SLP at 10/31/2017  3:09 PM  Version 1 of 1         [facility] - Speech Language Pathology   Swallow Initial Evaluation  Guy   MODIFIED BARIUM SWALLOW STUDY     Patient Name: Lydia Brumfield  : 1945  MRN: [id number]  Today's Date: 10/31/2017  Onset of Illness/Injury or Date of Surgery Date: 10/27/17 (admit date)          Admit Date: 10/27/2017    Lydia Brumfield  presents to the radiology suite this am from 3338/1P to participate in an instrumental MBS to evaluate safety/efficacy of swallowing fnx, determine safest/least restrictive diet.        Social History     Social History   • Marital status:      Spouse name: N/A   • Number of children: N/A   • Years of education: N/A     Occupational History   • Not on file.     Social History Main Topics   • Smoking status: Never Smoker   • Smokeless tobacco: Never Used   • Alcohol use No   • Drug use: No   • Sexual  activity: No     Other Topics Concern   • Not on file     Social History Narrative   • No narrative on file      Diet Orders (active)     Start     Ordered    10/28/17 0321  Diet Regular; Cardiac, Consistent Carbohydrate  Diet Effective Now      10/28/17 0320        Observed on ra.     Risks and benefits of the procedure are explained w/ verbalizing understanding/agreement to participate. Proceed per protocol.     Ms. Brumfield is positioned upright and centered in a wheelchair to accept multiple po presentations of solid cracker, puree, honey thick, nectar thick, and thin liquids via spoon, cup and straw, along w/ whole placebo pill in puree. She is able to self feed.     All views are from the lateral plane.     Facial/oral structures are symmetrical upon observation w/o lingual deviation upon protrusion. Oral mucosa are moist, pink and clean. Secretions are clear, thin and well controlled. OROM/SIMONA is wfl to imitate oral postures. Gag is not assessed. Volitional cough is adequate in intensity, clear in quality, nonproductive. Vocal quality is adequate in intensity, clear in quality w/ intelligible speech. She is a/a and cooperative to participate,  oriented to person, place, and time, follows simple directives, and participates in simple conversational exchanges.     Upon po presentations, adequate bolus anticipation w/ good labial seal for bolus clearance via spoon bowl, cup rim stability and suction via straw.  Bolus formation, manipulation,  and control are wfl w/ rotary mastication pattern. A-p transit is timely w/o oral residue. Tongue base retraction and linguavelar seal are adequate w/o premature spillage. No laryngeal penetration or aspiration is evidenced before the swallow.     Pharyngeal swallow is timely w/ adequate hyolaryngeal elevation and epiglottic inversion. Pharyngeal contraction is adequate w/o significant residue. No laryngeal penetration or aspiration evidenced during or after the swallow.      Full esophageal sweep reveals no mucosal abnormalities. Motility is wfl w/o retrograde flow noted.      Visit Dx:     ICD-10-CM ICD-9-CM   1. Dementia with behavioral disturbance, unspecified dementia type F03.91 294.21   2. Shortness of breath R06.02 786.05     Patient Active Problem List   Diagnosis   • Dyspnea     Past Medical History:   Diagnosis Date   • Dementia    • Depression    • Diabetes mellitus    • Disease of thyroid gland     Hypothyroidism   • HLD (hyperlipidemia)    • Hypertension    • Sleep apnea     compliant with cpap     Past Surgical History:   Procedure Laterality Date   • CHOLECYSTECTOMY     • REPLACEMENT TOTAL KNEE BILATERAL Bilateral    • TONSILLECTOMY       EDUCATION  The patient has been educated in the following areas:   Dysphagia (Swallowing Impairment).    Impression: Ms. Brumfield presents w/wfl oropharyngeal swallow w/o laryngeal penetration or aspiration across this evaluation.     SLP Recommendation and Plan    1. Continue regular consistency diet, thin liquids.   2. Meds whole in puree/thins.   3. SOFI precautions.   4. Oral care protocol.   No further SLP f/u warranted at this time.     D/w pt results and recommendations w/ verbal understanding and agreement.     D/w Dr. Gonsalves results and recommendations w/ verbal understanding and agreement.     Thank you for allowing me to participate in the care of your patient-  Telma Jefferson M.S., CCC/SLP                                               Time Calculation:     Therapy Charges for Today     Code Description Service Date Service Provider Modifiers Qty    15977473508 HC ST SWALLOWING CURRENT STATUS 10/31/2017 Telma Jefferson, MS CCC-SLP GN, CI 1    32529960827 HC ST SWALLOWING PROJECTED 10/31/2017 Telma Jefferson MS CCC-SLP GN, CH 1    10772529626 HC ST EVAL ORAL PHARYNG SWALLOW 3 10/31/2017 Telma Jefferson MS CCC-SLP GN 1    [id number] HC ST MOTION FLUORO EVAL SWALLOW 8 10/31/2017 Telma Jefferson MS CCC-SLP GN 1         SLP G-Codes  SLP NOMS Used?: Yes  Functional Limitations: Swallowing  Swallow Current Status (): At least 1 percent but less than 20 percent impaired, limited or restricted  Swallow Goal Status (): 0 percent impaired, limited or restricted    Telma Jefferson MS CCC-SLP  10/31/2017     Electronically signed by Telma Jefferson MS CCC-SLP at 10/31/2017  3:14 PM        Respiratory Therapy Notes (most recent note)     No notes of this type exist for this encounter.            Discharge Summary     No notes of this type exist for this encounter.        Discharge Order     Start     Ordered    11/01/17 1357  Discharge patient  Once     Expected Discharge Date:  11/01/17    Discharge Disposition:  Home or Self Care        11/01/17 1401

## 2017-11-01 NOTE — DISCHARGE INSTR - APPOINTMENTS
Follow up with Dr. Sutherland on Nov. 3 @ 2:45. [phone]. Order for Chest CT was faxed to office.

## 2017-11-01 NOTE — PLAN OF CARE
Problem: Patient Care Overview (Adult)  Goal: Plan of Care Review  Outcome: Ongoing (interventions implemented as appropriate)  Goal: Adult Individualization and Mutuality  Outcome: Ongoing (interventions implemented as appropriate)    Problem: Fall Risk (Adult)  Goal: Identify Related Risk Factors and Signs and Symptoms  Outcome: Ongoing (interventions implemented as appropriate)  Goal: Absence of Falls  Outcome: Ongoing (interventions implemented as appropriate)    Problem: Diabetes, Type 2 (Adult)  Goal: Signs and Symptoms of Listed Potential Problems Will be Absent or Manageable (Diabetes, Type 2)  Outcome: Ongoing (interventions implemented as appropriate)    Problem: Pressure Ulcer Risk (Juvenal Scale) (Adult,Obstetrics,Pediatric)  Goal: Skin Integrity  Outcome: Ongoing (interventions implemented as appropriate)
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
## 2017-11-01 NOTE — PLAN OF CARE
Problem: Patient Care Overview (Adult)  Goal: Plan of Care Review  Outcome: Ongoing (interventions implemented as appropriate)  Goal: Discharge Needs Assessment  Outcome: Ongoing (interventions implemented as appropriate)    Problem: Fall Risk (Adult)  Goal: Identify Related Risk Factors and Signs and Symptoms  Outcome: Ongoing (interventions implemented as appropriate)  Goal: Absence of Falls  Outcome: Ongoing (interventions implemented as appropriate)    Problem: Diabetes, Type 2 (Adult)  Goal: Signs and Symptoms of Listed Potential Problems Will be Absent or Manageable (Diabetes, Type 2)  Outcome: Ongoing (interventions implemented as appropriate)    Problem: Pressure Ulcer Risk (Juvenal Scale) (Adult,Obstetrics,Pediatric)  Goal: Identify Related Risk Factors and Signs and Symptoms  Outcome: Ongoing (interventions implemented as appropriate)  Goal: Skin Integrity  Outcome: Ongoing (interventions implemented as appropriate)

## 2017-11-01 NOTE — PROGRESS NOTES
Discharge Planning Assessment  [facility]     Patient Name: Lydia Brumfield  MRN: [id number]  Today's Date: 11/1/2017    Admit Date: 10/27/2017       Discharge Plan       11/01/17 1341    Case Management/Social Work Plan    Plan SS attempted contact with Viktoriya at [facility] and left voice message. SS to follow.     13:52 SS spoke to [facility] per Viktoriya who states plans to submit information to insurance. SS to follow.         Discharge Placement     Facility/Agency Request Status Selected? Address Phone Number Fax Number    [facility] [place] [facility] Sent     [address] [facility] [address] [phone]    [facility] Pending - No Request Sent     [address] [facility] [address] [phone]    [facility] Pending - No Request Sent     [address] [facility] [facility] [address] [phone]    [facility] - No Request Sent     [address] [address] [phone]                 Karma Adams

## 2017-11-03 ENCOUNTER — TELEPHONE (OUTPATIENT)
Dept: CALL CENTER | Facility: HOSPITAL | Age: 72
End: 2017-11-03
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
## 2017-12-08 ENCOUNTER — TRANSCRIBE ORDERS (OUTPATIENT)
Dept: ADMINISTRATIVE | Facility: HOSPITAL | Age: 72
End: 2017-12-08

## 2017-12-08 DIAGNOSIS — R59.1 LYMPHADENOPATHY: Primary | ICD-10-CM

## 2017-12-12 ENCOUNTER — APPOINTMENT (OUTPATIENT)
Dept: CT IMAGING | Facility: HOSPITAL | Age: 72
End: 2017-12-12

## 2017-12-14 ENCOUNTER — APPOINTMENT (OUTPATIENT)
Dept: CT IMAGING | Facility: HOSPITAL | Age: 72
End: 2017-12-14

## 2017-12-27 ENCOUNTER — HOSPITAL ENCOUNTER (OUTPATIENT)
Dept: CT IMAGING | Facility: HOSPITAL | Age: 72
Discharge: HOME | End: 2017-12-27
Admitting: INTERNAL MEDICINE

## 2017-12-27 DIAGNOSIS — R59.1 LYMPHADENOPATHY: ICD-10-CM

## 2017-12-27 PROCEDURE — 71250 CT THORAX DX C-: CPT

## 2017-12-27 PROCEDURE — 71250 CT THORAX DX C-: CPT | Performed by: RADIOLOGY

## 2019-06-26 ENCOUNTER — HOSPITAL ENCOUNTER (OUTPATIENT)
Dept: MAMMOGRAPHY | Facility: HOSPITAL | Age: 74
Discharge: HOME | End: 2019-06-26
Admitting: INTERNAL MEDICINE

## 2019-06-26 DIAGNOSIS — Z12.39 SCREENING BREAST EXAMINATION: ICD-10-CM

## 2019-06-26 PROCEDURE — 77067 SCR MAMMO BI INCL CAD: CPT

## 2019-06-26 PROCEDURE — 77067 SCR MAMMO BI INCL CAD: CPT | Performed by: RADIOLOGY

## 2019-06-26 PROCEDURE — 77063 BREAST TOMOSYNTHESIS BI: CPT | Performed by: RADIOLOGY

## 2019-06-26 PROCEDURE — 77063 BREAST TOMOSYNTHESIS BI: CPT

## 2020-08-13 ENCOUNTER — HOSPITAL ENCOUNTER (OUTPATIENT)
Dept: MAMMOGRAPHY | Facility: HOSPITAL | Age: 75
Discharge: HOME | End: 2020-08-13
Admitting: INTERNAL MEDICINE

## 2020-08-13 DIAGNOSIS — Z12.31 VISIT FOR SCREENING MAMMOGRAM: ICD-10-CM

## 2020-08-13 PROCEDURE — 77067 SCR MAMMO BI INCL CAD: CPT | Performed by: RADIOLOGY

## 2020-08-13 PROCEDURE — 77063 BREAST TOMOSYNTHESIS BI: CPT | Performed by: RADIOLOGY

## 2020-08-13 PROCEDURE — 77067 SCR MAMMO BI INCL CAD: CPT

## 2020-08-13 PROCEDURE — 77063 BREAST TOMOSYNTHESIS BI: CPT

## 2021-10-15 ENCOUNTER — HOSPITAL ENCOUNTER (OUTPATIENT)
Dept: MAMMOGRAPHY | Facility: HOSPITAL | Age: 76
Discharge: HOME | End: 2021-10-15
Admitting: INTERNAL MEDICINE

## 2021-10-15 DIAGNOSIS — Z12.31 VISIT FOR SCREENING MAMMOGRAM: ICD-10-CM

## 2021-10-15 PROCEDURE — 77063 BREAST TOMOSYNTHESIS BI: CPT

## 2021-10-15 PROCEDURE — 77067 SCR MAMMO BI INCL CAD: CPT | Performed by: RADIOLOGY

## 2021-10-15 PROCEDURE — 77063 BREAST TOMOSYNTHESIS BI: CPT | Performed by: RADIOLOGY

## 2021-10-15 PROCEDURE — 77067 SCR MAMMO BI INCL CAD: CPT

## 2022-10-20 ENCOUNTER — HOSPITAL ENCOUNTER (OUTPATIENT)
Dept: MAMMOGRAPHY | Facility: HOSPITAL | Age: 77
Discharge: HOME | End: 2022-10-20
Admitting: INTERNAL MEDICINE

## 2022-10-20 DIAGNOSIS — Z12.31 VISIT FOR SCREENING MAMMOGRAM: ICD-10-CM

## 2022-10-20 PROCEDURE — 77067 SCR MAMMO BI INCL CAD: CPT | Performed by: RADIOLOGY

## 2022-10-20 PROCEDURE — 77063 BREAST TOMOSYNTHESIS BI: CPT

## 2022-10-20 PROCEDURE — 77063 BREAST TOMOSYNTHESIS BI: CPT | Performed by: RADIOLOGY

## 2022-10-20 PROCEDURE — 77067 SCR MAMMO BI INCL CAD: CPT

## 2023-09-27 ENCOUNTER — TRANSCRIBE ORDERS (OUTPATIENT)
Dept: ADMINISTRATIVE | Facility: HOSPITAL | Age: 78
End: 2023-09-27
Payer: MEDICARE

## 2023-09-27 DIAGNOSIS — Z12.31 VISIT FOR SCREENING MAMMOGRAM: Primary | ICD-10-CM

## 2023-10-27 ENCOUNTER — HOSPITAL ENCOUNTER (OUTPATIENT)
Dept: MAMMOGRAPHY | Facility: HOSPITAL | Age: 78
Discharge: HOME | End: 2023-10-27
Admitting: INTERNAL MEDICINE
Payer: MEDICARE

## 2023-10-27 DIAGNOSIS — Z12.31 VISIT FOR SCREENING MAMMOGRAM: ICD-10-CM

## 2023-10-27 PROCEDURE — 77067 SCR MAMMO BI INCL CAD: CPT | Performed by: RADIOLOGY

## 2023-10-27 PROCEDURE — 77063 BREAST TOMOSYNTHESIS BI: CPT | Performed by: RADIOLOGY

## 2023-10-27 PROCEDURE — 77063 BREAST TOMOSYNTHESIS BI: CPT

## 2023-10-27 PROCEDURE — 77067 SCR MAMMO BI INCL CAD: CPT

## 2024-09-18 ENCOUNTER — TRANSCRIBE ORDERS (OUTPATIENT)
Dept: ADMINISTRATIVE | Facility: HOSPITAL | Age: 79
End: 2024-09-18
Payer: MEDICARE

## 2024-09-18 DIAGNOSIS — Z12.31 SCREENING MAMMOGRAM, ENCOUNTER FOR: Primary | ICD-10-CM

## 2024-10-28 ENCOUNTER — HOSPITAL ENCOUNTER (OUTPATIENT)
Dept: MAMMOGRAPHY | Facility: HOSPITAL | Age: 79
Discharge: HOME | End: 2024-10-28
Admitting: INTERNAL MEDICINE
Payer: MEDICARE

## 2024-10-28 DIAGNOSIS — Z12.31 SCREENING MAMMOGRAM, ENCOUNTER FOR: ICD-10-CM

## 2024-10-28 PROCEDURE — 77067 SCR MAMMO BI INCL CAD: CPT

## 2024-10-28 PROCEDURE — 77063 BREAST TOMOSYNTHESIS BI: CPT

## 2024-10-29 PROCEDURE — 77067 SCR MAMMO BI INCL CAD: CPT | Performed by: RADIOLOGY

## 2024-10-29 PROCEDURE — 77063 BREAST TOMOSYNTHESIS BI: CPT | Performed by: RADIOLOGY

## 2025-05-17 ENCOUNTER — TRANSCRIBE ORDERS (OUTPATIENT)
Dept: LAB | Facility: HOSPITAL | Age: 80
End: 2025-05-17
Payer: MEDICARE

## 2025-05-17 ENCOUNTER — LAB (OUTPATIENT)
Dept: LAB | Facility: HOSPITAL | Age: 80
End: 2025-05-17
Payer: MEDICARE

## 2025-05-17 DIAGNOSIS — R53.83 TIREDNESS: ICD-10-CM

## 2025-05-17 DIAGNOSIS — R53.83 TIREDNESS: Primary | ICD-10-CM

## 2025-05-17 DIAGNOSIS — E78.2 MIXED HYPERLIPIDEMIA: ICD-10-CM

## 2025-05-17 DIAGNOSIS — E11.9 DIABETES MELLITUS WITHOUT COMPLICATION: ICD-10-CM

## 2025-05-17 LAB
25(OH)D3 SERPL-MCNC: 42.7 NG/ML (ref 30–100)
ALBUMIN SERPL-MCNC: 4.1 G/DL (ref 3.5–5.2)
ALBUMIN/GLOB SERPL: 1.4 G/DL
ALP SERPL-CCNC: 95 U/L (ref 39–117)
ALT SERPL W P-5'-P-CCNC: 18 U/L (ref 1–33)
ANION GAP SERPL CALCULATED.3IONS-SCNC: 12.3 MMOL/L (ref 5–15)
AST SERPL-CCNC: 22 U/L (ref 1–32)
BASOPHILS # BLD AUTO: 0.05 10*3/MM3 (ref 0–0.2)
BASOPHILS NFR BLD AUTO: 0.7 % (ref 0–1.5)
BILIRUB SERPL-MCNC: 0.3 MG/DL (ref 0–1.2)
BUN SERPL-MCNC: 16 MG/DL (ref 8–23)
BUN/CREAT SERPL: 15.4 (ref 7–25)
CALCIUM SPEC-SCNC: 9.8 MG/DL (ref 8.6–10.5)
CHLORIDE SERPL-SCNC: 102 MMOL/L (ref 98–107)
CHOLEST SERPL-MCNC: 133 MG/DL (ref 0–200)
CO2 SERPL-SCNC: 25.7 MMOL/L (ref 22–29)
CREAT SERPL-MCNC: 1.04 MG/DL (ref 0.57–1)
DEPRECATED RDW RBC AUTO: 43 FL (ref 37–54)
EGFRCR SERPLBLD CKD-EPI 2021: 55.1 ML/MIN/1.73
EOSINOPHIL # BLD AUTO: 0.27 10*3/MM3 (ref 0–0.4)
EOSINOPHIL NFR BLD AUTO: 3.5 % (ref 0.3–6.2)
ERYTHROCYTE [DISTWIDTH] IN BLOOD BY AUTOMATED COUNT: 13.3 % (ref 12.3–15.4)
GLOBULIN UR ELPH-MCNC: 2.9 GM/DL
GLUCOSE SERPL-MCNC: 72 MG/DL (ref 65–99)
HBA1C MFR BLD: 5.8 % (ref 4.8–5.6)
HCT VFR BLD AUTO: 36.9 % (ref 34–46.6)
HDLC SERPL-MCNC: 39 MG/DL (ref 40–60)
HGB BLD-MCNC: 11.9 G/DL (ref 12–15.9)
IMM GRANULOCYTES # BLD AUTO: 0.02 10*3/MM3 (ref 0–0.05)
IMM GRANULOCYTES NFR BLD AUTO: 0.3 % (ref 0–0.5)
LDLC SERPL CALC-MCNC: 56 MG/DL (ref 0–100)
LDLC/HDLC SERPL: 1.2 {RATIO}
LYMPHOCYTES # BLD AUTO: 1.79 10*3/MM3 (ref 0.7–3.1)
LYMPHOCYTES NFR BLD AUTO: 23.3 % (ref 19.6–45.3)
MCH RBC QN AUTO: 28.5 PG (ref 26.6–33)
MCHC RBC AUTO-ENTMCNC: 32.2 G/DL (ref 31.5–35.7)
MCV RBC AUTO: 88.3 FL (ref 79–97)
MONOCYTES # BLD AUTO: 0.63 10*3/MM3 (ref 0.1–0.9)
MONOCYTES NFR BLD AUTO: 8.2 % (ref 5–12)
NEUTROPHILS NFR BLD AUTO: 4.93 10*3/MM3 (ref 1.7–7)
NEUTROPHILS NFR BLD AUTO: 64 % (ref 42.7–76)
NRBC BLD AUTO-RTO: 0 /100 WBC (ref 0–0.2)
PLATELET # BLD AUTO: 288 10*3/MM3 (ref 140–450)
PMV BLD AUTO: 9.3 FL (ref 6–12)
POTASSIUM SERPL-SCNC: 4.4 MMOL/L (ref 3.5–5.2)
PROT SERPL-MCNC: 7 G/DL (ref 6–8.5)
RBC # BLD AUTO: 4.18 10*6/MM3 (ref 3.77–5.28)
SODIUM SERPL-SCNC: 140 MMOL/L (ref 136–145)
TRIGL SERPL-MCNC: 236 MG/DL (ref 0–150)
TSH SERPL DL<=0.05 MIU/L-ACNC: 6.4 UIU/ML (ref 0.27–4.2)
VLDLC SERPL-MCNC: 38 MG/DL (ref 5–40)
WBC NRBC COR # BLD AUTO: 7.69 10*3/MM3 (ref 3.4–10.8)

## 2025-05-17 PROCEDURE — 80053 COMPREHEN METABOLIC PANEL: CPT

## 2025-05-17 PROCEDURE — 84443 ASSAY THYROID STIM HORMONE: CPT

## 2025-05-17 PROCEDURE — 80061 LIPID PANEL: CPT

## 2025-05-17 PROCEDURE — 85025 COMPLETE CBC W/AUTO DIFF WBC: CPT

## 2025-05-17 PROCEDURE — 83036 HEMOGLOBIN GLYCOSYLATED A1C: CPT

## 2025-05-17 PROCEDURE — 36415 COLL VENOUS BLD VENIPUNCTURE: CPT

## 2025-05-17 PROCEDURE — 82306 VITAMIN D 25 HYDROXY: CPT
